# Patient Record
Sex: MALE | Race: WHITE | NOT HISPANIC OR LATINO | Employment: UNEMPLOYED | URBAN - METROPOLITAN AREA
[De-identification: names, ages, dates, MRNs, and addresses within clinical notes are randomized per-mention and may not be internally consistent; named-entity substitution may affect disease eponyms.]

---

## 2019-01-10 ENCOUNTER — HISTORICAL (OUTPATIENT)
Dept: RADIOLOGY | Facility: HOSPITAL | Age: 59
End: 2019-01-10

## 2019-01-10 LAB — POC CREATININE: 1.2 MG/DL (ref 0.6–1.3)

## 2021-04-14 ENCOUNTER — HISTORICAL (OUTPATIENT)
Dept: ADMINISTRATIVE | Facility: HOSPITAL | Age: 61
End: 2021-04-14

## 2021-04-14 LAB
ABS NEUT (OLG): 4.95 X10(3)/MCL (ref 2.1–9.2)
ALBUMIN SERPL-MCNC: 4 GM/DL (ref 3.5–5)
ALBUMIN/GLOB SERPL: 1.1 RATIO (ref 1.1–2)
ALP SERPL-CCNC: 146 UNIT/L (ref 40–150)
ALT SERPL-CCNC: 24 UNIT/L (ref 0–55)
AMPHET UR QL SCN: NEGATIVE
AST SERPL-CCNC: 19 UNIT/L (ref 5–34)
BARBITURATE SCN PRESENT UR: NEGATIVE
BASOPHILS # BLD AUTO: 0 X10(3)/MCL (ref 0–0.2)
BASOPHILS NFR BLD AUTO: 0 %
BENZODIAZ UR QL SCN: NEGATIVE
BILIRUB SERPL-MCNC: 0.3 MG/DL
BILIRUBIN DIRECT+TOT PNL SERPL-MCNC: 0.1 MG/DL (ref 0–0.5)
BILIRUBIN DIRECT+TOT PNL SERPL-MCNC: 0.2 MG/DL (ref 0–0.8)
BUN SERPL-MCNC: 10.8 MG/DL (ref 8.4–25.7)
CALCIUM SERPL-MCNC: 9.5 MG/DL (ref 8.4–10.2)
CANNABINOIDS UR QL SCN: NEGATIVE
CHLORIDE SERPL-SCNC: 100 MMOL/L (ref 98–107)
CHOLEST SERPL-MCNC: 208 MG/DL
CHOLEST/HDLC SERPL: 5 {RATIO} (ref 0–5)
CO2 SERPL-SCNC: 27 MMOL/L (ref 22–29)
COCAINE UR QL SCN: NEGATIVE
CREAT SERPL-MCNC: 0.97 MG/DL (ref 0.73–1.18)
EOSINOPHIL # BLD AUTO: 0.2 X10(3)/MCL (ref 0–0.9)
EOSINOPHIL NFR BLD AUTO: 2 %
ERYTHROCYTE [DISTWIDTH] IN BLOOD BY AUTOMATED COUNT: 13.1 % (ref 11.5–17)
EST. AVERAGE GLUCOSE BLD GHB EST-MCNC: 122.6 MG/DL
GLOBULIN SER-MCNC: 3.7 GM/DL (ref 2.4–3.5)
GLUCOSE SERPL-MCNC: 116 MG/DL (ref 74–100)
HBA1C MFR BLD: 5.9 %
HCT VFR BLD AUTO: 51.4 % (ref 42–52)
HDLC SERPL-MCNC: 42 MG/DL (ref 35–60)
HGB BLD-MCNC: 16.3 GM/DL (ref 14–18)
IMM GRANULOCYTES # BLD AUTO: 0.18 % (ref 0–0.02)
IMM GRANULOCYTES NFR BLD AUTO: 2.1 % (ref 0–0.43)
LDLC SERPL CALC-MCNC: 114 MG/DL (ref 50–140)
LYMPHOCYTES # BLD AUTO: 2.5 X10(3)/MCL (ref 0.6–4.6)
LYMPHOCYTES NFR BLD AUTO: 29 %
MCH RBC QN AUTO: 31.1 PG (ref 27–31)
MCHC RBC AUTO-ENTMCNC: 31.7 GM/DL (ref 33–36)
MCV RBC AUTO: 98.1 FL (ref 80–94)
MONOCYTES # BLD AUTO: 0.8 X10(3)/MCL (ref 0.1–1.3)
MONOCYTES NFR BLD AUTO: 9 %
NEUTROPHILS # BLD AUTO: 4.95 X10(3)/MCL (ref 1.4–7.9)
NEUTROPHILS NFR BLD AUTO: 57 %
OPIATES UR QL SCN: NEGATIVE
PCP UR QL: NEGATIVE
PH UR STRIP.AUTO: 7 [PH] (ref 5–7.5)
PLATELET # BLD AUTO: 174 X10(3)/MCL (ref 130–400)
PMV BLD AUTO: 11.3 FL (ref 9.4–12.4)
POTASSIUM SERPL-SCNC: 3.6 MMOL/L (ref 3.5–5.1)
PROT SERPL-MCNC: 7.7 GM/DL (ref 6.4–8.3)
RBC # BLD AUTO: 5.24 X10(6)/MCL (ref 4.7–6.1)
SODIUM SERPL-SCNC: 142 MMOL/L (ref 136–145)
SP GR FLD REFRACTOMETRY: 1 (ref 1–1)
TRIGL SERPL-MCNC: 262 MG/DL (ref 34–140)
TSH SERPL-ACNC: 2.44 UIU/ML (ref 0.35–4.94)
VALPROATE SERPL-MCNC: 69.6 UG/ML (ref 50–100)
VLDLC SERPL CALC-MCNC: 52 MG/DL
WBC # SPEC AUTO: 8.7 X10(3)/MCL (ref 4.5–11.5)

## 2021-04-19 ENCOUNTER — HISTORICAL (OUTPATIENT)
Dept: ADMINISTRATIVE | Facility: HOSPITAL | Age: 61
End: 2021-04-19

## 2021-04-19 LAB
ABS NEUT (OLG): 6.45 X10(3)/MCL (ref 2.1–9.2)
BASOPHILS # BLD AUTO: 0 X10(3)/MCL (ref 0–0.2)
BASOPHILS NFR BLD AUTO: 0 %
EOSINOPHIL # BLD AUTO: 0.1 X10(3)/MCL (ref 0–0.9)
EOSINOPHIL NFR BLD AUTO: 1 %
ERYTHROCYTE [DISTWIDTH] IN BLOOD BY AUTOMATED COUNT: 13.1 % (ref 11.5–17)
HCT VFR BLD AUTO: 50.4 % (ref 42–52)
HGB BLD-MCNC: 16 GM/DL (ref 14–18)
IMM GRANULOCYTES # BLD AUTO: 0.1 % (ref 0–0.02)
IMM GRANULOCYTES NFR BLD AUTO: 1 % (ref 0–0.43)
LYMPHOCYTES # BLD AUTO: 2.1 X10(3)/MCL (ref 0.6–4.6)
LYMPHOCYTES NFR BLD AUTO: 21 %
MCH RBC QN AUTO: 30.8 PG (ref 27–31)
MCHC RBC AUTO-ENTMCNC: 31.7 GM/DL (ref 33–36)
MCV RBC AUTO: 97.1 FL (ref 80–94)
MONOCYTES # BLD AUTO: 1 X10(3)/MCL (ref 0.1–1.3)
MONOCYTES NFR BLD AUTO: 11 %
NEUTROPHILS # BLD AUTO: 6.45 X10(3)/MCL (ref 1.4–7.9)
NEUTROPHILS NFR BLD AUTO: 66 %
PLATELET # BLD AUTO: 191 X10(3)/MCL (ref 130–400)
PMV BLD AUTO: 11.4 FL (ref 9.4–12.4)
RBC # BLD AUTO: 5.19 X10(6)/MCL (ref 4.7–6.1)
VALPROATE SERPL-MCNC: 52 UG/ML (ref 50–100)
WBC # SPEC AUTO: 9.8 X10(3)/MCL (ref 4.5–11.5)

## 2021-04-26 ENCOUNTER — HISTORICAL (OUTPATIENT)
Dept: ADMINISTRATIVE | Facility: HOSPITAL | Age: 61
End: 2021-04-26

## 2021-04-26 LAB
ABS NEUT (OLG): 4.52 X10(3)/MCL (ref 2.1–9.2)
BASOPHILS # BLD AUTO: 0 X10(3)/MCL (ref 0–0.2)
BASOPHILS NFR BLD AUTO: 0 %
EOSINOPHIL # BLD AUTO: 0.1 X10(3)/MCL (ref 0–0.9)
EOSINOPHIL NFR BLD AUTO: 1 %
ERYTHROCYTE [DISTWIDTH] IN BLOOD BY AUTOMATED COUNT: 12.9 % (ref 11.5–17)
HCT VFR BLD AUTO: 49.1 % (ref 42–52)
HGB BLD-MCNC: 15.9 GM/DL (ref 14–18)
IMM GRANULOCYTES # BLD AUTO: 0.11 % (ref 0–0.02)
IMM GRANULOCYTES NFR BLD AUTO: 1.5 % (ref 0–0.43)
LYMPHOCYTES # BLD AUTO: 1.9 X10(3)/MCL (ref 0.6–4.6)
LYMPHOCYTES NFR BLD AUTO: 26 %
MCH RBC QN AUTO: 31 PG (ref 27–31)
MCHC RBC AUTO-ENTMCNC: 32.4 GM/DL (ref 33–36)
MCV RBC AUTO: 95.7 FL (ref 80–94)
MONOCYTES # BLD AUTO: 0.7 X10(3)/MCL (ref 0.1–1.3)
MONOCYTES NFR BLD AUTO: 10 %
NEUTROPHILS # BLD AUTO: 4.52 X10(3)/MCL (ref 1.4–7.9)
NEUTROPHILS NFR BLD AUTO: 61 %
PLATELET # BLD AUTO: 167 X10(3)/MCL (ref 130–400)
PMV BLD AUTO: 12.1 FL (ref 9.4–12.4)
RBC # BLD AUTO: 5.13 X10(6)/MCL (ref 4.7–6.1)
VALPROATE SERPL-MCNC: 63.2 UG/ML (ref 50–100)
WBC # SPEC AUTO: 7.4 X10(3)/MCL (ref 4.5–11.5)

## 2022-04-11 ENCOUNTER — HISTORICAL (OUTPATIENT)
Dept: ADMINISTRATIVE | Facility: HOSPITAL | Age: 62
End: 2022-04-11

## 2022-04-29 VITALS
OXYGEN SATURATION: 88 % | WEIGHT: 237.63 LBS | BODY MASS INDEX: 32.19 KG/M2 | HEIGHT: 72 IN | SYSTOLIC BLOOD PRESSURE: 126 MMHG | DIASTOLIC BLOOD PRESSURE: 82 MMHG

## 2022-11-21 ENCOUNTER — HOSPITAL ENCOUNTER (OUTPATIENT)
Facility: HOSPITAL | Age: 62
Discharge: LEFT BEFORE DISCHARGE | End: 2022-11-22
Attending: STUDENT IN AN ORGANIZED HEALTH CARE EDUCATION/TRAINING PROGRAM | Admitting: INTERNAL MEDICINE
Payer: MEDICARE

## 2022-11-21 DIAGNOSIS — I63.9 CEREBROVASCULAR ACCIDENT (CVA), UNSPECIFIED MECHANISM: ICD-10-CM

## 2022-11-21 DIAGNOSIS — J10.1 INFLUENZA A: ICD-10-CM

## 2022-11-21 DIAGNOSIS — I95.9 HYPOTENSION, UNSPECIFIED HYPOTENSION TYPE: Primary | ICD-10-CM

## 2022-11-21 DIAGNOSIS — R07.9 CHEST PAIN: ICD-10-CM

## 2022-11-21 DIAGNOSIS — I63.9 STROKE: ICD-10-CM

## 2022-11-21 DIAGNOSIS — R41.82 ALTERED MENTAL STATUS, UNSPECIFIED ALTERED MENTAL STATUS TYPE: ICD-10-CM

## 2022-11-21 DIAGNOSIS — N17.9 AKI (ACUTE KIDNEY INJURY): ICD-10-CM

## 2022-11-21 DIAGNOSIS — R53.1 WEAKNESS: ICD-10-CM

## 2022-11-21 LAB
ALBUMIN SERPL-MCNC: 3.5 GM/DL (ref 3.4–4.8)
ALBUMIN/GLOB SERPL: 0.8 RATIO (ref 1.1–2)
ALP SERPL-CCNC: 137 UNIT/L (ref 40–150)
ALT SERPL-CCNC: 34 UNIT/L (ref 0–55)
AMPHET UR QL SCN: NEGATIVE
APPEARANCE UR: ABNORMAL
AST SERPL-CCNC: 44 UNIT/L (ref 5–34)
BACTERIA #/AREA URNS AUTO: ABNORMAL /HPF
BARBITURATE SCN PRESENT UR: NEGATIVE
BASOPHILS # BLD AUTO: 0.03 X10(3)/MCL (ref 0–0.2)
BASOPHILS NFR BLD AUTO: 0.2 %
BENZODIAZ UR QL SCN: NEGATIVE
BILIRUB UR QL STRIP.AUTO: NEGATIVE MG/DL
BILIRUBIN DIRECT+TOT PNL SERPL-MCNC: 0.6 MG/DL
BNP BLD-MCNC: <10 PG/ML
BUN SERPL-MCNC: 52.1 MG/DL (ref 8.4–25.7)
CALCIUM SERPL-MCNC: 10.4 MG/DL (ref 8.8–10)
CANNABINOIDS UR QL SCN: NEGATIVE
CHLORIDE SERPL-SCNC: 93 MMOL/L (ref 98–107)
CHOLEST SERPL-MCNC: 216 MG/DL
CHOLEST/HDLC SERPL: 7 {RATIO} (ref 0–5)
CO2 SERPL-SCNC: 28 MMOL/L (ref 23–31)
COCAINE UR QL SCN: NEGATIVE
COLOR UR AUTO: YELLOW
CREAT SERPL-MCNC: 3.21 MG/DL (ref 0.73–1.18)
CREAT UR-MCNC: 247.4 MG/DL (ref 63–166)
EOSINOPHIL # BLD AUTO: 0 X10(3)/MCL (ref 0–0.9)
EOSINOPHIL NFR BLD AUTO: 0 %
ERYTHROCYTE [DISTWIDTH] IN BLOOD BY AUTOMATED COUNT: 13.2 % (ref 11.5–17)
EST. AVERAGE GLUCOSE BLD GHB EST-MCNC: 131.2 MG/DL
FENTANYL UR QL SCN: POSITIVE
FERRITIN SERPL-MCNC: 930.98 NG/ML (ref 21.81–274.66)
FLUAV AG UPPER RESP QL IA.RAPID: DETECTED
FLUBV AG UPPER RESP QL IA.RAPID: NOT DETECTED
FOLATE SERPL-MCNC: 31 NG/ML (ref 7–31.4)
GFR SERPLBLD CREATININE-BSD FMLA CKD-EPI: 21 MLS/MIN/1.73/M2
GLOBULIN SER-MCNC: 4.2 GM/DL (ref 2.4–3.5)
GLUCOSE SERPL-MCNC: 146 MG/DL (ref 82–115)
GLUCOSE UR QL STRIP.AUTO: NORMAL MG/DL
HBA1C MFR BLD: 6.2 %
HCT VFR BLD AUTO: 42 % (ref 42–52)
HDLC SERPL-MCNC: 30 MG/DL (ref 35–60)
HGB BLD-MCNC: 13.7 GM/DL (ref 14–18)
HIV 1+2 AB+HIV1 P24 AG SERPL QL IA: NONREACTIVE
HYALINE CASTS #/AREA URNS LPF: ABNORMAL /LPF
IMM GRANULOCYTES # BLD AUTO: 0.2 X10(3)/MCL (ref 0–0.04)
IMM GRANULOCYTES NFR BLD AUTO: 1.2 %
IRON SATN MFR SERPL: 10 % (ref 20–50)
IRON SERPL-MCNC: 25 UG/DL (ref 65–175)
KETONES UR QL STRIP.AUTO: NEGATIVE MG/DL
LACTATE SERPL-SCNC: 1.1 MMOL/L (ref 0.5–2.2)
LACTATE SERPL-SCNC: 2 MMOL/L (ref 0.5–2.2)
LDLC SERPL CALC-MCNC: 136 MG/DL (ref 50–140)
LEUKOCYTE ESTERASE UR QL STRIP.AUTO: NEGATIVE UNIT/L
LYMPHOCYTES # BLD AUTO: 1.09 X10(3)/MCL (ref 0.6–4.6)
LYMPHOCYTES NFR BLD AUTO: 6.4 %
MCH RBC QN AUTO: 30.2 PG (ref 27–31)
MCHC RBC AUTO-ENTMCNC: 32.6 MG/DL (ref 33–36)
MCV RBC AUTO: 92.5 FL (ref 80–94)
MDMA UR QL SCN: NEGATIVE
MONOCYTES # BLD AUTO: 1.79 X10(3)/MCL (ref 0.1–1.3)
MONOCYTES NFR BLD AUTO: 10.4 %
MUCOUS THREADS URNS QL MICRO: ABNORMAL /LPF
NEUTROPHILS # BLD AUTO: 14 X10(3)/MCL (ref 2.1–9.2)
NEUTROPHILS NFR BLD AUTO: 81.8 %
NITRITE UR QL STRIP.AUTO: NEGATIVE
NRBC BLD AUTO-RTO: 0 %
OPIATES UR QL SCN: NEGATIVE
PCP UR QL: NEGATIVE
PH UR STRIP.AUTO: 6 [PH]
PH UR: 6 [PH] (ref 3–11)
PLATELET # BLD AUTO: 162 X10(3)/MCL (ref 130–400)
PMV BLD AUTO: 12.2 FL (ref 7.4–10.4)
POCT GLUCOSE: 107 MG/DL (ref 70–110)
POTASSIUM SERPL-SCNC: 3.5 MMOL/L (ref 3.5–5.1)
PROT SERPL-MCNC: 7.7 GM/DL (ref 5.8–7.6)
PROT UR QL STRIP.AUTO: ABNORMAL MG/DL
PROT UR STRIP-MCNC: 71 MG/DL
RBC # BLD AUTO: 4.54 X10(6)/MCL (ref 4.7–6.1)
RBC #/AREA URNS AUTO: ABNORMAL /HPF
RBC UR QL AUTO: ABNORMAL UNIT/L
RSV A 5' UTR RNA NPH QL NAA+PROBE: NOT DETECTED
SARS-COV-2 RNA RESP QL NAA+PROBE: NOT DETECTED
SODIUM SERPL-SCNC: 136 MMOL/L (ref 136–145)
SP GR UR STRIP.AUTO: 1.02
SPECIFIC GRAVITY, URINE AUTO (.000) (OHS): 1.02 (ref 1–1.03)
SQUAMOUS #/AREA URNS LPF: ABNORMAL /HPF
TIBC SERPL-MCNC: 216 UG/DL (ref 69–240)
TIBC SERPL-MCNC: 241 UG/DL (ref 250–450)
TRANSFERRIN SERPL-MCNC: 213 MG/DL (ref 163–344)
TRIGL SERPL-MCNC: 252 MG/DL (ref 34–140)
TROPONIN I SERPL-MCNC: 0.01 NG/ML (ref 0–0.04)
TSH SERPL-ACNC: 4.13 UIU/ML (ref 0.35–4.94)
URINE PROTEIN/CREATININE RATIO (OHS): 0.3
UROBILINOGEN UR STRIP-ACNC: NORMAL MG/DL
VIT B12 SERPL-MCNC: 450 PG/ML (ref 213–816)
VLDLC SERPL CALC-MCNC: 50 MG/DL
WBC # SPEC AUTO: 17.1 X10(3)/MCL (ref 4.5–11.5)
WBC #/AREA URNS AUTO: ABNORMAL /HPF

## 2022-11-21 PROCEDURE — 25000003 PHARM REV CODE 250: Performed by: STUDENT IN AN ORGANIZED HEALTH CARE EDUCATION/TRAINING PROGRAM

## 2022-11-21 PROCEDURE — 83605 ASSAY OF LACTIC ACID: CPT | Performed by: STUDENT IN AN ORGANIZED HEALTH CARE EDUCATION/TRAINING PROGRAM

## 2022-11-21 PROCEDURE — 25000003 PHARM REV CODE 250: Performed by: INTERNAL MEDICINE

## 2022-11-21 PROCEDURE — 0241U COVID/RSV/FLU A&B PCR: CPT | Performed by: STUDENT IN AN ORGANIZED HEALTH CARE EDUCATION/TRAINING PROGRAM

## 2022-11-21 PROCEDURE — 96361 HYDRATE IV INFUSION ADD-ON: CPT

## 2022-11-21 PROCEDURE — 96372 THER/PROPH/DIAG INJ SC/IM: CPT | Performed by: STUDENT IN AN ORGANIZED HEALTH CARE EDUCATION/TRAINING PROGRAM

## 2022-11-21 PROCEDURE — 82607 VITAMIN B-12: CPT | Performed by: STUDENT IN AN ORGANIZED HEALTH CARE EDUCATION/TRAINING PROGRAM

## 2022-11-21 PROCEDURE — 63600175 PHARM REV CODE 636 W HCPCS: Performed by: STUDENT IN AN ORGANIZED HEALTH CARE EDUCATION/TRAINING PROGRAM

## 2022-11-21 PROCEDURE — 85025 COMPLETE CBC W/AUTO DIFF WBC: CPT | Performed by: STUDENT IN AN ORGANIZED HEALTH CARE EDUCATION/TRAINING PROGRAM

## 2022-11-21 PROCEDURE — 36415 COLL VENOUS BLD VENIPUNCTURE: CPT | Performed by: STUDENT IN AN ORGANIZED HEALTH CARE EDUCATION/TRAINING PROGRAM

## 2022-11-21 PROCEDURE — 82728 ASSAY OF FERRITIN: CPT | Performed by: STUDENT IN AN ORGANIZED HEALTH CARE EDUCATION/TRAINING PROGRAM

## 2022-11-21 PROCEDURE — 99285 EMERGENCY DEPT VISIT HI MDM: CPT | Mod: 25

## 2022-11-21 PROCEDURE — 94761 N-INVAS EAR/PLS OXIMETRY MLT: CPT

## 2022-11-21 PROCEDURE — 80307 DRUG TEST PRSMV CHEM ANLYZR: CPT | Performed by: STUDENT IN AN ORGANIZED HEALTH CARE EDUCATION/TRAINING PROGRAM

## 2022-11-21 PROCEDURE — 81001 URINALYSIS AUTO W/SCOPE: CPT | Mod: 59 | Performed by: STUDENT IN AN ORGANIZED HEALTH CARE EDUCATION/TRAINING PROGRAM

## 2022-11-21 PROCEDURE — G0378 HOSPITAL OBSERVATION PER HR: HCPCS

## 2022-11-21 PROCEDURE — 83540 ASSAY OF IRON: CPT | Performed by: STUDENT IN AN ORGANIZED HEALTH CARE EDUCATION/TRAINING PROGRAM

## 2022-11-21 PROCEDURE — 84153 ASSAY OF PSA TOTAL: CPT | Performed by: STUDENT IN AN ORGANIZED HEALTH CARE EDUCATION/TRAINING PROGRAM

## 2022-11-21 PROCEDURE — 80053 COMPREHEN METABOLIC PANEL: CPT | Performed by: STUDENT IN AN ORGANIZED HEALTH CARE EDUCATION/TRAINING PROGRAM

## 2022-11-21 PROCEDURE — 82570 ASSAY OF URINE CREATININE: CPT | Performed by: STUDENT IN AN ORGANIZED HEALTH CARE EDUCATION/TRAINING PROGRAM

## 2022-11-21 PROCEDURE — 96367 TX/PROPH/DG ADDL SEQ IV INF: CPT

## 2022-11-21 PROCEDURE — 87389 HIV-1 AG W/HIV-1&-2 AB AG IA: CPT | Performed by: STUDENT IN AN ORGANIZED HEALTH CARE EDUCATION/TRAINING PROGRAM

## 2022-11-21 PROCEDURE — 27000221 HC OXYGEN, UP TO 24 HOURS

## 2022-11-21 PROCEDURE — 84154 ASSAY OF PSA FREE: CPT | Mod: 59 | Performed by: STUDENT IN AN ORGANIZED HEALTH CARE EDUCATION/TRAINING PROGRAM

## 2022-11-21 PROCEDURE — 84100 ASSAY OF PHOSPHORUS: CPT | Performed by: STUDENT IN AN ORGANIZED HEALTH CARE EDUCATION/TRAINING PROGRAM

## 2022-11-21 PROCEDURE — 83880 ASSAY OF NATRIURETIC PEPTIDE: CPT | Performed by: STUDENT IN AN ORGANIZED HEALTH CARE EDUCATION/TRAINING PROGRAM

## 2022-11-21 PROCEDURE — 93005 ELECTROCARDIOGRAM TRACING: CPT

## 2022-11-21 PROCEDURE — 82746 ASSAY OF FOLIC ACID SERUM: CPT | Performed by: STUDENT IN AN ORGANIZED HEALTH CARE EDUCATION/TRAINING PROGRAM

## 2022-11-21 PROCEDURE — 84484 ASSAY OF TROPONIN QUANT: CPT | Performed by: STUDENT IN AN ORGANIZED HEALTH CARE EDUCATION/TRAINING PROGRAM

## 2022-11-21 PROCEDURE — 83605 ASSAY OF LACTIC ACID: CPT | Mod: 91 | Performed by: STUDENT IN AN ORGANIZED HEALTH CARE EDUCATION/TRAINING PROGRAM

## 2022-11-21 PROCEDURE — 84443 ASSAY THYROID STIM HORMONE: CPT | Performed by: STUDENT IN AN ORGANIZED HEALTH CARE EDUCATION/TRAINING PROGRAM

## 2022-11-21 PROCEDURE — 83735 ASSAY OF MAGNESIUM: CPT | Performed by: STUDENT IN AN ORGANIZED HEALTH CARE EDUCATION/TRAINING PROGRAM

## 2022-11-21 PROCEDURE — 80061 LIPID PANEL: CPT | Performed by: STUDENT IN AN ORGANIZED HEALTH CARE EDUCATION/TRAINING PROGRAM

## 2022-11-21 PROCEDURE — 83036 HEMOGLOBIN GLYCOSYLATED A1C: CPT | Performed by: STUDENT IN AN ORGANIZED HEALTH CARE EDUCATION/TRAINING PROGRAM

## 2022-11-21 PROCEDURE — 96365 THER/PROPH/DIAG IV INF INIT: CPT

## 2022-11-21 PROCEDURE — 87040 BLOOD CULTURE FOR BACTERIA: CPT | Performed by: STUDENT IN AN ORGANIZED HEALTH CARE EDUCATION/TRAINING PROGRAM

## 2022-11-21 RX ORDER — CITALOPRAM 20 MG/1
20 TABLET, FILM COATED ORAL EVERY MORNING
COMMUNITY
Start: 2022-11-07

## 2022-11-21 RX ORDER — LINAGLIPTIN 5 MG/1
5 TABLET, FILM COATED ORAL EVERY MORNING
COMMUNITY
Start: 2022-11-07

## 2022-11-21 RX ORDER — GLUCAGON 1 MG
1 KIT INJECTION
Status: DISCONTINUED | OUTPATIENT
Start: 2022-11-21 | End: 2022-11-22 | Stop reason: HOSPADM

## 2022-11-21 RX ORDER — GEMFIBROZIL 600 MG/1
600 TABLET, FILM COATED ORAL EVERY MORNING
COMMUNITY
Start: 2022-11-07

## 2022-11-21 RX ORDER — IBUPROFEN 200 MG
24 TABLET ORAL
Status: DISCONTINUED | OUTPATIENT
Start: 2022-11-21 | End: 2022-11-22 | Stop reason: HOSPADM

## 2022-11-21 RX ORDER — NALOXONE HCL 0.4 MG/ML
0.02 VIAL (ML) INJECTION
Status: DISCONTINUED | OUTPATIENT
Start: 2022-11-21 | End: 2022-11-22 | Stop reason: HOSPADM

## 2022-11-21 RX ORDER — INSULIN ASPART 100 [IU]/ML
0-5 INJECTION, SOLUTION INTRAVENOUS; SUBCUTANEOUS
Status: DISCONTINUED | OUTPATIENT
Start: 2022-11-21 | End: 2022-11-22 | Stop reason: HOSPADM

## 2022-11-21 RX ORDER — CLOZAPINE 100 MG/1
100 TABLET ORAL NIGHTLY
Status: DISCONTINUED | OUTPATIENT
Start: 2022-11-21 | End: 2022-11-22 | Stop reason: HOSPADM

## 2022-11-21 RX ORDER — CLOZAPINE 100 MG/1
TABLET ORAL
COMMUNITY
Start: 2022-11-04

## 2022-11-21 RX ORDER — CLONAZEPAM 1 MG/1
1 TABLET ORAL NIGHTLY
COMMUNITY
Start: 2022-11-07

## 2022-11-21 RX ORDER — PIOGLITAZONEHYDROCHLORIDE 45 MG/1
45 TABLET ORAL EVERY MORNING
COMMUNITY
Start: 2022-11-07

## 2022-11-21 RX ORDER — B-COMPLEX WITH VITAMIN C
1 TABLET ORAL EVERY MORNING
COMMUNITY
Start: 2022-11-07

## 2022-11-21 RX ORDER — ATENOLOL 50 MG/1
50 TABLET ORAL EVERY MORNING
COMMUNITY
Start: 2022-11-07

## 2022-11-21 RX ORDER — OMEPRAZOLE 40 MG/1
40 CAPSULE, DELAYED RELEASE ORAL EVERY MORNING
COMMUNITY
Start: 2022-11-07

## 2022-11-21 RX ORDER — SODIUM CHLORIDE 9 MG/ML
INJECTION, SOLUTION INTRAVENOUS CONTINUOUS
Status: DISCONTINUED | OUTPATIENT
Start: 2022-11-21 | End: 2022-11-22 | Stop reason: HOSPADM

## 2022-11-21 RX ORDER — RAMIPRIL 10 MG/1
10 CAPSULE ORAL EVERY MORNING
COMMUNITY
Start: 2022-11-07

## 2022-11-21 RX ORDER — CITALOPRAM 10 MG/1
20 TABLET ORAL EVERY MORNING
Status: DISCONTINUED | OUTPATIENT
Start: 2022-11-22 | End: 2022-11-22 | Stop reason: HOSPADM

## 2022-11-21 RX ORDER — DIVALPROEX SODIUM 500 MG/1
1500 TABLET, FILM COATED, EXTENDED RELEASE ORAL NIGHTLY
COMMUNITY
Start: 2022-11-07

## 2022-11-21 RX ORDER — OSELTAMIVIR PHOSPHATE 30 MG/1
30 CAPSULE ORAL DAILY
Status: DISCONTINUED | OUTPATIENT
Start: 2022-11-21 | End: 2022-11-22 | Stop reason: HOSPADM

## 2022-11-21 RX ORDER — OSELTAMIVIR PHOSPHATE 75 MG/1
75 CAPSULE ORAL 2 TIMES DAILY
Status: DISCONTINUED | OUTPATIENT
Start: 2022-11-21 | End: 2022-11-21

## 2022-11-21 RX ORDER — EZETIMIBE 10 MG/1
10 TABLET ORAL DAILY
COMMUNITY
Start: 2022-11-07

## 2022-11-21 RX ORDER — IBUPROFEN 200 MG
16 TABLET ORAL
Status: DISCONTINUED | OUTPATIENT
Start: 2022-11-21 | End: 2022-11-22 | Stop reason: HOSPADM

## 2022-11-21 RX ORDER — HEPARIN SODIUM 5000 [USP'U]/ML
5000 INJECTION, SOLUTION INTRAVENOUS; SUBCUTANEOUS EVERY 8 HOURS
Status: DISCONTINUED | OUTPATIENT
Start: 2022-11-21 | End: 2022-11-22 | Stop reason: HOSPADM

## 2022-11-21 RX ORDER — ATORVASTATIN CALCIUM 40 MG/1
80 TABLET, FILM COATED ORAL DAILY
Status: DISCONTINUED | OUTPATIENT
Start: 2022-11-22 | End: 2022-11-22 | Stop reason: HOSPADM

## 2022-11-21 RX ORDER — SODIUM CHLORIDE 0.9 % (FLUSH) 0.9 %
10 SYRINGE (ML) INJECTION EVERY 8 HOURS PRN
Status: DISCONTINUED | OUTPATIENT
Start: 2022-11-21 | End: 2022-11-22 | Stop reason: HOSPADM

## 2022-11-21 RX ORDER — TRAZODONE HYDROCHLORIDE 100 MG/1
200 TABLET ORAL NIGHTLY
COMMUNITY
Start: 2022-11-07

## 2022-11-21 RX ORDER — ASPIRIN 325 MG
325 TABLET ORAL DAILY
Status: DISCONTINUED | OUTPATIENT
Start: 2022-11-22 | End: 2022-11-22 | Stop reason: HOSPADM

## 2022-11-21 RX ORDER — POTASSIUM CHLORIDE 20 MEQ/1
20 TABLET, EXTENDED RELEASE ORAL 2 TIMES DAILY
COMMUNITY
Start: 2022-11-07

## 2022-11-21 RX ORDER — GEMFIBROZIL 600 MG/1
600 TABLET, FILM COATED ORAL EVERY MORNING
Status: DISCONTINUED | OUTPATIENT
Start: 2022-11-22 | End: 2022-11-22 | Stop reason: HOSPADM

## 2022-11-21 RX ORDER — DIVALPROEX SODIUM 500 MG/1
1500 TABLET, FILM COATED, EXTENDED RELEASE ORAL NIGHTLY
Status: DISCONTINUED | OUTPATIENT
Start: 2022-11-21 | End: 2022-11-22 | Stop reason: HOSPADM

## 2022-11-21 RX ADMIN — PIPERACILLIN AND TAZOBACTAM 4.5 G: 4; .5 INJECTION, POWDER, LYOPHILIZED, FOR SOLUTION INTRAVENOUS; PARENTERAL at 02:11

## 2022-11-21 RX ADMIN — SODIUM CHLORIDE, POTASSIUM CHLORIDE, SODIUM LACTATE AND CALCIUM CHLORIDE 2769 ML: 600; 310; 30; 20 INJECTION, SOLUTION INTRAVENOUS at 01:11

## 2022-11-21 RX ADMIN — SODIUM CHLORIDE: 9 INJECTION, SOLUTION INTRAVENOUS at 07:11

## 2022-11-21 RX ADMIN — VANCOMYCIN HYDROCHLORIDE 1000 MG: 500 INJECTION, POWDER, LYOPHILIZED, FOR SOLUTION INTRAVENOUS at 02:11

## 2022-11-21 RX ADMIN — OSELTAMIVIR PHOSPHATE 30 MG: 30 CAPSULE ORAL at 09:11

## 2022-11-21 RX ADMIN — SODIUM CHLORIDE 1 G: 900 INJECTION INTRAVENOUS at 08:11

## 2022-11-21 RX ADMIN — CLOZAPINE 100 MG: 100 TABLET ORAL at 09:11

## 2022-11-21 RX ADMIN — HEPARIN SODIUM 5000 UNITS: 5000 INJECTION, SOLUTION INTRAVENOUS; SUBCUTANEOUS at 09:11

## 2022-11-21 RX ADMIN — DIVALPROEX SODIUM 1500 MG: 500 TABLET, FILM COATED, EXTENDED RELEASE ORAL at 09:11

## 2022-11-21 NOTE — H&P
[facility] Medicine History and Physical     Attending Physician: Mario Alberto Farrar MD    Subjective:      Brief HPI:  Hernando Chung is a 62 y.o. male who  has a past medical history of Schizophrenia, unspecified.  He presented to [facility] on 11/21/2022  with a primary complaint of Hypotension (C/o cough, congestion, chest pain, SOB, generalized weakness, no appetite x2 weeks. Hypotensive and low o2 sat in triage. )  .  Patient presents to the emergency department for 3 days of not eating or drinking.  And increased cough and congestion during the same time.  Information is obtained from patient's sister, patient is schizophrenic and currently taking medication for it.  However on my interview patient understands that he is in a hospital, that he knows the year, and he knows that he is in [place].  Sister says that he presents with generalized weakness, and some chest pain secondary to a cough.  Patient goes to [facility], patient's sister is unaware of what medications he is currently taking however she knows that he is on quite a bit of medication.  Patient says that he has some dizziness, and some chest pain, and some congestion.  Patient denies any issues with urination however he had just urinated on himself a few minutes before had walked into the room.  Patient has been having some slurring of his words per patient's sister that had started with all this happened proximally 3 days ago.  And also been having some dizziness and unsteadiness when standing.    Past Medical History:   Diagnosis Date    Schizophrenia, unspecified        History reviewed. No pertinent surgical history.    Review of patient's allergies indicates:  No Known Allergies    No current outpatient medications     Family History    None         Tobacco Use    Smoking status: Not on file    Smokeless tobacco: Not on file   Substance and Sexual Activity    Alcohol use: Not on file    Drug use: Not on file    Sexual activity: Not on file         Review of Systems:  Unable to fully assess 2/2 to patients mental status     Objective:     Vital Signs:  Vital Signs (Most Recent):  Temp: 97.3 °F (36.3 °C) (11/21/22 1248)  Pulse: 84 (11/21/22 1632)  Resp: 20 (11/21/22 1516)  BP: 94/61 (11/21/22 1632)  SpO2: 98 % (11/21/22 1632) Vital Signs (24h Range):  Temp:  [97.3 °F (36.3 °C)] 97.3 °F (36.3 °C)  Pulse:  [75-84] 84  Resp:  [15-30] 20  SpO2:  [86 %-99 %] 98 %  BP: ()/(55-62) 94/61   Body mass index is 27.86 kg/m².     Intake/Output Summary (Last 24 hours) at 11/21/2022 1759  Last data filed at 11/21/2022 1555  Gross per 24 hour   Intake 3119 ml   Output --   Net 3119 ml       Physical Examination:  General:  Well developed, well nourished, no acute respiratory distress  Head: Normocephalic, atraumatic  Eyes: PERRL, EOMI, anicteric sclera. Xanthomas noticeable  Throat: No posterior pharyngeal erythema or exudate, no tonsillar exudate  Neck: supple, normal ROM, no JVD  CVS:  RRR, S1 and S2 normal, no murmurs, no added heart sounds, rubs, gallops, regular peripheral pulses, and no peripheral edema  Resp:  Lungs clear to auscultation bilaterally, no wheezes, rales, or rhonci  GI:  Abdomen soft, non-tender, non-distended, normoactive bowel sounds  MSK:  Full range of motion, no obvious deformities   Skin:  No rashes, ulcers, erythema  Neuro:  Alert and oriented x2, CNII-XII grossly intact, patient has finger-nose-finger positive on left side and on the right side it is less.  Negative for dysdiadochokinesia.  Both proximal and distal muscles in the upper and lower extremities is 5/5.  When I stent patient up patient is unsteady, patient's blood pressure dropped by 15 systolic when standing from lying down.  Patient is unsteady when standing and Romberg positive.  Patient is unable to ambulate without falling.  Patient is unsteady on his feet.  Psych:  Appropriate mood,     Laboratory:    Recent Labs   Lab 11/21/22  1308   WBC 17.1*   HGB 13.7*   HCT 42.0       MCV 92.5   RDW 13.2     Recent Labs   Lab 11/21/22  1308   TROPONINI 0.011   BNP <10.0     Recent Labs   Lab 11/21/22  1308   TROPONINI 0.011   BNP <10.0     No results for input(s): CHOL, HDL, LDLCALC, TRIG, CHOLHDL in the last 168 hours. Recent Labs   Lab 11/21/22  1308      K 3.5   CHLORIDE 93*   CO2 28   BUN 52.1*   CREATININE 3.21*   CALCIUM 10.4*     Recent Labs   Lab 11/21/22  1308   ALBUMIN 3.5   BILITOT 0.6   AST 44*   ALKPHOS 137   ALT 34     No results for input(s): IRON, TIBC, FERRITIN, SATURATEDIRO, [id number], FOLATE in the last 168 hours.  Recent Labs   Lab 11/21/22  1308   TSH 4.1319          Microbiology Data:  Microbiology Results (last 7 days)       Procedure Component Value Units Date/Time    Blood culture x two cultures. Draw prior to antibiotics. [[id number]] Collected: 11/21/22 1308    Order Status: Sent Specimen: Blood from Hand, Right Updated: 11/21/22 1404    Blood culture x two cultures. Draw prior to antibiotics. [[id number]] Collected: 11/21/22 1320    Order Status: Sent Specimen: Blood from Hand, Left Updated: 11/21/22 1404             Other Results:    Radiology:  Imaging Results              US Retroperitoneal Limited (In process)                      CT Head Without Contrast (Final result)  Result time 11/21/22 16:18:48      Final result by Edelmira East MD (11/21/22 16:18:48)                   Impression:      Punctate area of hypoattenuation in the basal ganglia on the right consistent with ischemia of undetermined age    Hypoattenuated area in the cerebellum on the left side consistent with ischemia of indeterminate age    Old lacunar infarct in the basal ganglia on the left    This patient would benefit from an MRI for  correlation      Electronically signed by: Edelmira East  Date:    11/21/2022  Time:    16:18               Narrative:    EXAMINATION:  CT HEAD WITHOUT CONTRAST    CLINICAL HISTORY:  Mental status change, unknown  cause;    TECHNIQUE:  Multiple axial images were obtained from the base of the brain to the vertex without contrast administration.  Sagittal and coronal reconstructions were performed. .Automatic exposure control  (AEC) is utilized to reduce patient radiation exposure.    COMPARISON:  02/11/2012    FINDINGS:  There is no intracranial mass or lesion seen.  No hemorrhage is seen.  There is area of hypoattenuation in the cerebellum on the left side consistent with ischemia of undetermined age.  There is a punctate area of old lacunar infarct seen in the basal ganglia on the left.  There is a area of hypoattenuation in the basal ganglia on the right consistent with focal area ischemia of undetermined age..  The ventricles and basilar cisterns appear normal.  Brain parenchyma appears grossly unremarkable.    Posterior fossa appears normal.  The calvarium is intact.  The paranasal sinuses shows evidence of ethmoid sinusitis and mild mucosal thickening in the sphenoid and frontal sinus..                                       X-Ray Chest AP Portable (Final result)  Result time 11/21/22 13:29:59      Final result by Madonna Medina MD (11/21/22 13:29:59)                   Impression:      No acute abnormality of the chest.      Electronically signed by: Madonna Medina  Date:    11/21/2022  Time:    13:29               Narrative:    EXAMINATION:  XR CHEST AP PORTABLE    CLINICAL HISTORY:  Sepsis;    COMPARISON:  None    FINDINGS:  The heart is normal in size.  The lungs are clear without focal consolidation.  There is no pleural effusion or visible pneumothorax.                                      Current Medications:     Infusions:        Scheduled:   [START ON 11/22/2022] aspirin  325 mg Oral Daily    heparin (porcine)  5,000 Units Subcutaneous Q8H         PRN:   dextrose 10%    dextrose 10%    glucagon (human recombinant)    glucose    glucose    naloxone    sodium chloride 0.9%        Antibiotics and Day Number of  Therapy:  Antibiotics (From admission, onward)      None                 Assessment & Plan:   Stroke of indeterminate age:  -CT scan with findings above stroke of indeterminate age.  Patient's ABCD2 score is 4, will do aspirin 325 daily.  Atorvastatin 80.    -will do secondary workup to include carotid ultrasound, will hold off on CTA of the head as patient has a acute kidney injury.   -will get echocardiogram with bubble.  Patient may benefit from a event monitor.    -will get an MRI tomorrow     Acute kidney injury:  -will get a protein/creatinine ratio   -Elevated calcium likely secondary to dehydration, patient did not have any fluids or anything eat for the past 3 days.    -patient was given 30 cc/kg of IV fluid bolus.  Will give a maintenance rate of 100 of ns.  Patient had tachypnea, and elevated white blood count, however no nidus of infection noticeable.  Patient was started on empiric antibiotic coverage with vancomycin and Zosyn.    -will send a MRSA PCR, patient's urine has some WBCs however it is nitrite negative will empirically cover with Rocephin patient denies any symptoms    Schizophrenia:  Altered mental status:  -patient has been having schizophrenia since he was 18 years old patient is on quite a bit of medication and may have some aspect of polypharmacy.  Patient is on clozapine, clonazepam, Celexa, Depakote    Hypotension:   Likely secondary to polypharmacy patient is on atenolol, chlorthalidone, ramipril.  Will hold medications and get some orthostatics    Influenza:  -start renally dosed Tamiflu    Diabetes mellitus:   Patient was on Actos, Tradjenta,  Sliding scale insulin in the meantime    History of prostate cancer:   -patient had a robotic prostatectomy in 2019 there were no metastasis noted on PET scan.  Will get a PSA       CODE STATUS:  Full code  Access:  IV  Antibiotics:  Rocephin, patient was given vancomycin and Zosyn in the ED  Diet:  Diabetic  DVT Prophylaxis:  Heparin 5 K  GI  Prophylaxis:  none  Fluids:  Normal saline 100 cc/hour      Disposition:  Patient admitted for hypotension and altered mental status.  However patient is on quite a bit of medication for schizophrenia.  Patient is on clozapine.  Will continue psychiatry medications.  However with patient's hypotension will hold off on hypertension medications as may have some aspect of polypharmacy.  Will give IV fluid resuscitation along with holding of medications and will see orthostatics.  Patient also has a history of remote infarcts in the brain, will get a secondary workup along with an MRI.  Unable to get a CTA head of because of CKD.         Kirit Harrell MD  Internal Medicine Resident PGY-II

## 2022-11-22 VITALS
BODY MASS INDEX: 28.42 KG/M2 | HEIGHT: 71 IN | SYSTOLIC BLOOD PRESSURE: 141 MMHG | RESPIRATION RATE: 22 BRPM | WEIGHT: 203 LBS | OXYGEN SATURATION: 93 % | DIASTOLIC BLOOD PRESSURE: 84 MMHG | TEMPERATURE: 98 F | HEART RATE: 87 BPM

## 2022-11-22 LAB
ALBUMIN SERPL-MCNC: 2.7 GM/DL (ref 3.4–4.8)
ALBUMIN/GLOB SERPL: 0.8 RATIO (ref 1.1–2)
ALP SERPL-CCNC: 109 UNIT/L (ref 40–150)
ALT SERPL-CCNC: 31 UNIT/L (ref 0–55)
AST SERPL-CCNC: 43 UNIT/L (ref 5–34)
AV INDEX (PROSTH): 0.75
AV MEAN GRADIENT: 4 MMHG
AV PEAK GRADIENT: 6 MMHG
AV VELOCITY RATIO: 0.68
BASOPHILS # BLD AUTO: 0.02 X10(3)/MCL (ref 0–0.2)
BASOPHILS NFR BLD AUTO: 0.2 %
BILIRUBIN DIRECT+TOT PNL SERPL-MCNC: 0.3 MG/DL
BSA FOR ECHO PROCEDURE: 2.15 M2
BUN SERPL-MCNC: 35.8 MG/DL (ref 8.4–25.7)
CALCIUM SERPL-MCNC: 9.5 MG/DL (ref 8.8–10)
CHLORIDE SERPL-SCNC: 101 MMOL/L (ref 98–107)
CO2 SERPL-SCNC: 28 MMOL/L (ref 23–31)
CREAT SERPL-MCNC: 1.51 MG/DL (ref 0.73–1.18)
CRP SERPL-MCNC: 129.2 MG/L
CV ECHO LV RWT: 0.35 CM
DOP CALC AO PEAK VEL: 1.23 M/S
DOP CALC AO VTI: 22.7 CM
DOP CALC LVOT PEAK VEL: 0.84 M/S
DOP CALC MV VTI: 20.9 CM
DOP CALCLVOT PEAK VEL VTI: 17.1 CM
E WAVE DECELERATION TIME: 140.03 MSEC
E/A RATIO: 1.52
E/E' RATIO: 5.83 M/S
ECHO LV POSTERIOR WALL: 0.77 CM (ref 0.6–1.1)
EJECTION FRACTION: 55 %
EOSINOPHIL # BLD AUTO: 0.01 X10(3)/MCL (ref 0–0.9)
EOSINOPHIL NFR BLD AUTO: 0.1 %
ERYTHROCYTE [DISTWIDTH] IN BLOOD BY AUTOMATED COUNT: 13.1 % (ref 11.5–17)
ERYTHROCYTE [SEDIMENTATION RATE] IN BLOOD: 44 MM/HR (ref 0–15)
FRACTIONAL SHORTENING: 30 % (ref 28–44)
FREE/TOTAL PSA (OHS): NORMAL
GFR SERPLBLD CREATININE-BSD FMLA CKD-EPI: 52 MLS/MIN/1.73/M2
GLOBULIN SER-MCNC: 3.2 GM/DL (ref 2.4–3.5)
GLUCOSE SERPL-MCNC: 115 MG/DL (ref 82–115)
HCT VFR BLD AUTO: 36.1 % (ref 42–52)
HGB BLD-MCNC: 11.8 GM/DL (ref 14–18)
IMM GRANULOCYTES # BLD AUTO: 0.07 X10(3)/MCL (ref 0–0.04)
IMM GRANULOCYTES NFR BLD AUTO: 0.8 %
INTERVENTRICULAR SEPTUM: 0.99 CM (ref 0.6–1.1)
LEFT ATRIUM SIZE: 2.64 CM
LEFT INTERNAL DIMENSION IN SYSTOLE: 3.08 CM (ref 2.1–4)
LEFT VENTRICLE DIASTOLIC VOLUME INDEX: 41.23 ML/M2
LEFT VENTRICLE DIASTOLIC VOLUME: 87.4 ML
LEFT VENTRICLE MASS INDEX: 58 G/M2
LEFT VENTRICLE SYSTOLIC VOLUME INDEX: 17.6 ML/M2
LEFT VENTRICLE SYSTOLIC VOLUME: 37.34 ML
LEFT VENTRICULAR INTERNAL DIMENSION IN DIASTOLE: 4.39 CM (ref 3.5–6)
LEFT VENTRICULAR MASS: 123.74 G
LV LATERAL E/E' RATIO: 5.58 M/S
LV SEPTAL E/E' RATIO: 6.09 M/S
LVOT MG: 1.68 MMHG
LVOT MV: 0.62 CM/S
LYMPHOCYTES # BLD AUTO: 0.94 X10(3)/MCL (ref 0.6–4.6)
LYMPHOCYTES NFR BLD AUTO: 10.3 %
MAGNESIUM SERPL-MCNC: 2.3 MG/DL (ref 1.6–2.6)
MCH RBC QN AUTO: 29.9 PG (ref 27–31)
MCHC RBC AUTO-ENTMCNC: 32.7 MG/DL (ref 33–36)
MCV RBC AUTO: 91.6 FL (ref 80–94)
MONOCYTES # BLD AUTO: 0.8 X10(3)/MCL (ref 0.1–1.3)
MONOCYTES NFR BLD AUTO: 8.8 %
MRSA PCR SCRN (OHS): NOT DETECTED
MV MEAN GRADIENT: 1 MMHG
MV PEAK A VEL: 0.44 M/S
MV PEAK E VEL: 0.67 M/S
MV PEAK GRADIENT: 3 MMHG
MV STENOSIS PRESSURE HALF TIME: 40.61 MS
MV VALVE AREA P 1/2 METHOD: 5.42 CM2
NEUTROPHILS # BLD AUTO: 7.3 X10(3)/MCL (ref 2.1–9.2)
NEUTROPHILS NFR BLD AUTO: 79.8 %
NRBC BLD AUTO-RTO: 0 %
PHOSPHATE SERPL-MCNC: 2.8 MG/DL (ref 2.3–4.7)
PLATELET # BLD AUTO: 127 X10(3)/MCL (ref 130–400)
PMV BLD AUTO: 12.5 FL (ref 7.4–10.4)
POCT GLUCOSE: 102 MG/DL (ref 70–110)
POCT GLUCOSE: 130 MG/DL (ref 70–110)
POTASSIUM SERPL-SCNC: 2.9 MMOL/L (ref 3.5–5.1)
PROT SERPL-MCNC: 5.9 GM/DL (ref 5.8–7.6)
PSA FREE MFR SERPL: NORMAL %
PSA FREE SERPL-MCNC: <0.1 NG/ML
PSA SERPL-MCNC: <0.1 NG/ML
RA PRESSURE: 8 MMHG
RBC # BLD AUTO: 3.94 X10(6)/MCL (ref 4.7–6.1)
SODIUM SERPL-SCNC: 140 MMOL/L (ref 136–145)
TDI LATERAL: 0.12 M/S
TDI SEPTAL: 0.11 M/S
TDI: 0.12 M/S
WBC # SPEC AUTO: 9.1 X10(3)/MCL (ref 4.5–11.5)

## 2022-11-22 PROCEDURE — 36415 COLL VENOUS BLD VENIPUNCTURE: CPT | Performed by: STUDENT IN AN ORGANIZED HEALTH CARE EDUCATION/TRAINING PROGRAM

## 2022-11-22 PROCEDURE — 63600175 PHARM REV CODE 636 W HCPCS: Performed by: STUDENT IN AN ORGANIZED HEALTH CARE EDUCATION/TRAINING PROGRAM

## 2022-11-22 PROCEDURE — 85025 COMPLETE CBC W/AUTO DIFF WBC: CPT | Performed by: STUDENT IN AN ORGANIZED HEALTH CARE EDUCATION/TRAINING PROGRAM

## 2022-11-22 PROCEDURE — 86140 C-REACTIVE PROTEIN: CPT | Performed by: STUDENT IN AN ORGANIZED HEALTH CARE EDUCATION/TRAINING PROGRAM

## 2022-11-22 PROCEDURE — 96372 THER/PROPH/DIAG INJ SC/IM: CPT | Mod: 59 | Performed by: STUDENT IN AN ORGANIZED HEALTH CARE EDUCATION/TRAINING PROGRAM

## 2022-11-22 PROCEDURE — 96376 TX/PRO/DX INJ SAME DRUG ADON: CPT

## 2022-11-22 PROCEDURE — 96361 HYDRATE IV INFUSION ADD-ON: CPT

## 2022-11-22 PROCEDURE — G0378 HOSPITAL OBSERVATION PER HR: HCPCS

## 2022-11-22 PROCEDURE — 27000221 HC OXYGEN, UP TO 24 HOURS

## 2022-11-22 PROCEDURE — 96360 HYDRATION IV INFUSION INIT: CPT

## 2022-11-22 PROCEDURE — 96374 THER/PROPH/DIAG INJ IV PUSH: CPT | Mod: 59

## 2022-11-22 PROCEDURE — 92610 EVALUATE SWALLOWING FUNCTION: CPT

## 2022-11-22 PROCEDURE — 94761 N-INVAS EAR/PLS OXIMETRY MLT: CPT

## 2022-11-22 PROCEDURE — 80053 COMPREHEN METABOLIC PANEL: CPT | Performed by: STUDENT IN AN ORGANIZED HEALTH CARE EDUCATION/TRAINING PROGRAM

## 2022-11-22 PROCEDURE — 87641 MR-STAPH DNA AMP PROBE: CPT | Performed by: STUDENT IN AN ORGANIZED HEALTH CARE EDUCATION/TRAINING PROGRAM

## 2022-11-22 PROCEDURE — 25000003 PHARM REV CODE 250: Performed by: STUDENT IN AN ORGANIZED HEALTH CARE EDUCATION/TRAINING PROGRAM

## 2022-11-22 PROCEDURE — 25500020 PHARM REV CODE 255: Performed by: INTERNAL MEDICINE

## 2022-11-22 PROCEDURE — 85651 RBC SED RATE NONAUTOMATED: CPT | Performed by: STUDENT IN AN ORGANIZED HEALTH CARE EDUCATION/TRAINING PROGRAM

## 2022-11-22 RX ORDER — SODIUM CHLORIDE, SODIUM LACTATE, POTASSIUM CHLORIDE, CALCIUM CHLORIDE 600; 310; 30; 20 MG/100ML; MG/100ML; MG/100ML; MG/100ML
INJECTION, SOLUTION INTRAVENOUS CONTINUOUS
Status: DISCONTINUED | OUTPATIENT
Start: 2022-11-22 | End: 2022-11-22 | Stop reason: HOSPADM

## 2022-11-22 RX ORDER — POTASSIUM CHLORIDE 7.45 MG/ML
10 INJECTION INTRAVENOUS
Status: DISPENSED | OUTPATIENT
Start: 2022-11-22 | End: 2022-11-22

## 2022-11-22 RX ADMIN — CITALOPRAM HYDROBROMIDE 20 MG: 10 TABLET, FILM COATED ORAL at 09:11

## 2022-11-22 RX ADMIN — GEMFIBROZIL 600 MG: 600 TABLET ORAL at 09:11

## 2022-11-22 RX ADMIN — SODIUM CHLORIDE, POTASSIUM CHLORIDE, SODIUM LACTATE AND CALCIUM CHLORIDE 1000 ML: 600; 310; 30; 20 INJECTION, SOLUTION INTRAVENOUS at 09:11

## 2022-11-22 RX ADMIN — HEPARIN SODIUM 5000 UNITS: 5000 INJECTION, SOLUTION INTRAVENOUS; SUBCUTANEOUS at 02:11

## 2022-11-22 RX ADMIN — HEPARIN SODIUM 5000 UNITS: 5000 INJECTION, SOLUTION INTRAVENOUS; SUBCUTANEOUS at 09:11

## 2022-11-22 RX ADMIN — POTASSIUM CHLORIDE 10 MEQ: 7.46 INJECTION, SOLUTION INTRAVENOUS at 10:11

## 2022-11-22 RX ADMIN — POTASSIUM CHLORIDE 10 MEQ: 7.46 INJECTION, SOLUTION INTRAVENOUS at 09:11

## 2022-11-22 RX ADMIN — OSELTAMIVIR PHOSPHATE 30 MG: 30 CAPSULE ORAL at 09:11

## 2022-11-22 RX ADMIN — ASPIRIN 325 MG ORAL TABLET 325 MG: 325 PILL ORAL at 09:11

## 2022-11-22 RX ADMIN — HUMAN ALBUMIN MICROSPHERES AND PERFLUTREN 0.44 MG: 10; .22 INJECTION, SOLUTION INTRAVENOUS at 09:11

## 2022-11-22 RX ADMIN — ATORVASTATIN CALCIUM 80 MG: 40 TABLET, FILM COATED ORAL at 09:11

## 2022-11-22 NOTE — NURSING
Patient has pulled out his IV X 3, c/o pain from the IV K+ even though rate was decreased several times. On  call team 3 notified and will order oral meds

## 2022-11-22 NOTE — NURSING
On-call team Dr Velazquez notified that MRI has posted and that pt remains without IV site or tele monitor due to removal by patient numerous times

## 2022-11-22 NOTE — NURSING
Patient has pulled out his IV X 6 today and refuses to wear tele monitor, I notified Dr Haque, he really had little respond and said he will see what to do. I informed him that I would not restart his IV because I cannot watch him and it will be the same result.

## 2022-11-22 NOTE — PT/OT/SLP EVAL
"[facility]  Cranial Nerve Exam and Clinical Swallow Exam    Name: Hernando Chung   MRN: [id number]    Therapy Diagnosis: WFL oral and pharyngeal phases of the swallow     Physician: UMA Harrell    Date of Evaluation:  11/22/2022    Time In:  1100  Time Out:  1115  Total Billable Time: 15     Procedure Min.   Swallow and Oral Function Evaluation   15     Chief Complaint: No complaints of dysphagia, Pt reported "they don't ask what I want    General Information:  Affect: Alert  Cooperative  Oxygen:  room air  Hearing: WFL  Orientation:Person, Place, Time     Objective:       Cranial Nerve 5: Trigeminal Nerve  Motor Jaw Posture at rest: Closed  Mandible Elevation/Depression: WFL  Mandible lateralization: Unable to lateralize right  Abnormal movement: absent Interpretation:   intact   Sensory Forehead: WFL  Cheek: WFL  Jaw: WFL  Facial Pain: None noted Interpretation:   intact     Cranial Nerve 7: Facial Nerve  Motor Facial Symmetry: WNL  Wrinkle Forehead: WFL  Close eyes tightly: WFL  Labial Protrusion: WFL  Labial Retraction: WFL  Buccal Strength with Labial Seal: WFL  Abnormal movement: absent Interpretation:   intact   Sensory Formal testing not completed. Patient denied any changes in taste      Cranial Nerves IX and X: Glossopharyngeal and Vagus Nerves  Motor Palatal Symmetry (Rest):  DNT  Palatal Symmetry (Movement):  DNT  Cough: Perceptually strong  Voice Prior to PO intake: Clear  Resonance: Normal  Abnormal movement: absent Interpretation:   intact     Cranial Nerve XII: Hypoglossal Nerve  Motor Tongue at rest: WNL  Lingual Protrusion: WNL  Lingual Protrusion against Resistance: WNL  Lingual Lateralization: WNL  Abnormal movement: absent Interpretation:   intact     Other information:  Volitional Swallow: Able to palpate laryngeal rise  Mucosal Quality: No abnormal findings  Secretion Management: Secretion Mgmt: adequate  Dentition: Edentulous     Predisposing dysphagia risk factors: " NA  Clinical signs of possible chronic dysphagia: none noted  Precipitating dysphagia risk factors: NA    Pain:   0/10  Pain Location / Description: NA    Assessment :     [place] Swallow Protocol:  The [place] Swallow Protocol was administered. The patient was alert and provided the instructions prior to the beginning of the protocol. The patient consumed 3 oz before putting the cup down. Patient drank with consecutive swallows. Patient with no cough, no throat clear, no wet vocal quality. Patient without overt signs or symptoms of penetration/aspiration. Patient  passed the screener.    [place] Swallow Protocol dictates patient remain NPO if fail screener; (Denishar et al. 2014) however, an objective swallow assessment is not available at this time, patient will remain NPO until objective assessment is completed unless otherwise indicated. SLP is recommending MBSS assess swallow effectiveness, ID/rule out penetration and aspiration, make appropriate recommendations regarding safest diet consistency, effective compensatory strategies and safe eating environment.     PO Trials:   Patient presented with:   ICE CHIPS: NA  THIN:-self regulated thin liquid via cup  PUREE: self regulated  MINCED AND MOIST: NA  SOFT AND BITE SIZED: self regulated  SOLID: self regulated    *Thicken liquids were not used in this assessment. OErlin (2018) reported that thickened liquids have no sound evidence as reducing risk of pneumonia in patients with dysphagia and can cause harm by increasing risk of dehydration. It also presents an increased risk of UTI, electrolyte imbalance, constipation, fecal impaction, cognitive impairment, functional decline, and even death (Langmore, 2002; Sammy, 2016).  This supports the assertion that we should confirm a patient requires thickened liquids with an instrumental swallow study prior to recommending them.    Limitations:  NA    Interpretation:     Mr. Chung exhibited functional swallowing skills on this  assessment. The patient had no anterior loss of the bolus with adequate closure of the lips around the spoon, straw and bolus. No residue remained in the oral cavity following the swallow. Patient without overt clinical signs/symptoms of aspiration on any PO trials given. SLP is recommending regular diet with thin liquids at this time. SLP intervention is not warranted at this time. Please feel free to re-consult as warranted.  SLP educated pt and nurseAlok on CSE results and diet recommendations. SLP to follow up x 1 regarding diet tolerance.     Education:  SLP role in care, Plan of care, Results of the study, and Recommendations     Barriers to Learning:  NA    Teaching Method: Verbal    Plan:     LONG TERM GOAL    1.Hernando Chung will tolerate Regular consistency diet with Thin liquids without overt s/sx of aspiration to maintain appropriate nutrition and hydration.  Initial       Recommendations:     Consistency Recommendations:  Thin liquids (IDDSI 0) and Regular consistencies (IDDSI 7).   Precautions:frequent oral care  Risk Management: use good oral hygiene , sit upright for all PO intake, and increase physical mobility as tolerated  Specialist Referrals: NA  Ancillary Tests: Consider N/a   Therapy: Dysphagia therapy is not recommended at this time.  Follow-up exam: Follow up swallow study is not indicated at this time.    The Functional Oral Intake Scale (FOIS) is an ordinal scale that is used to assess the current status and meaningful change in the oral intake. FOIS levels include:        TUBE DEPENDENT   (Levels 1-3) 1. No oral intake    2. Tube dependent with minimal/inconsistent oral intake    3. Tube supplements with consistent oral intake          TOTAL ORAL INTAKE (Levels 4-7) 4. Total oral intake of a single consistency    5. Total oral intake of multiple consistencies requiring special preparation    6. Total oral intake with no special preparation, but must avoid specific foods or liquid  items    7. Total oral intake with no restrictions   Patient is currently judged to be at FOIS Level 7       Reference:   American Speech-Language-Hearing Association. (n.d.b). Adult dysphagia. (Practice Portal). https://www.gary.org/practice-portal/clinical-topics/adult-dysphagia/  NAYAN Varghese (2018). Use of modified diets to prevent aspiration in oropharyngeal dysphagia: Is current practice justified?. BMC Geriatrics, 18(1), 1-10.  NAYAN Yanez., SHADY Cox, HAIDER Pierce, & SANDER Betts (2002). Predictors of aspiration pneumonia in nursing home residents.  Dysphagia, 17(4), 298-307.  TRUDY Christianson. (2016). Best practices for dehydration prevention. Perspectives of the [place] Special Interest Groups - SIG 13, 1(2), 72- 80.    Didi Quintero MS, CCC-SLP  11/22/2022

## 2022-11-22 NOTE — PT/OT/SLP PROGRESS
SLP attempting CSE. However, patient currently NPO pending medical assessment/procedure per Alok bradley. SLP to re-attempt as schedule permits.

## 2022-11-22 NOTE — PROGRESS NOTES
Contacted Rae with [facility] ([phone]) who agreed to fax medical records to [phone].

## 2022-11-22 NOTE — PROGRESS NOTES
[facility] Medicine Wards Progress Note     Resident Team: [facility] Medicine List 3  Attending Physician: Mario Alberto Farrar MD    Subjective:      Brief HPI:  Hernando Chung is a 62 y.o. male who  has a past medical history of Schizophrenia, unspecified.  He presented to [facility] on 11/21/2022  with a primary complaint of Hypotension (C/o cough, congestion, chest pain, SOB, generalized weakness, no appetite x2 weeks. Hypotensive and low o2 sat in triage. )  .  Patient presents to the emergency department for 3 days of not eating or drinking.  And increased cough and congestion during the same time.  Information is obtained from patient's sister, patient is schizophrenic and currently taking medication for it.  However on my interview patient understands that he is in a hospital, that he knows the year, and he knows that he is in [place].  Sister says that he presents with generalized weakness, and some chest pain secondary to a cough.  Patient goes to [facility], patient's sister is unaware of what medications he is currently taking however she knows that he is on quite a bit of medication.  Patient says that he has some dizziness, and some chest pain, and some congestion.  Patient denies any issues with urination however he had just urinated on himself a few minutes before had walked into the room.  Patient has been having some slurring of his words per patient's sister that had started with all this happened proximally 3 days ago.  And also been having some dizziness and unsteadiness when standing.         Interval History:   Patient was seen examined this morning, patient says he is no complaints at this time.  Patient says he has been having some trouble sleeping.  However he denies any chest pain, palpitations, shortness of breath or wheezing.  Patient had his echocardiogram which showed no physiologic shunting and ultrasound bilateral Doppler carotid showed no meaningful stenosis or blockage.  Patient's  blood pressure has responded clinically to holding patient's antihypertensives.  Patient undergo MRI today      Review of Systems:  14 point ROS completed and negative except as indicated above.     Objective:     Vital Signs:  Vital Signs (Most Recent):  Temp: 98.4 °F (36.9 °C) (11/22/22 1156)  Pulse: 89 (11/22/22 1156)  Resp: 20 (11/21/22 1948)  BP: 138/78 (11/22/22 1156)  SpO2: (!) 91 % (11/22/22 1156)   Vital Signs (24h Range):  Temp:  [98.1 °F (36.7 °C)-98.7 °F (37.1 °C)] 98.4 °F (36.9 °C)  Pulse:  [75-91] 89  Resp:  [18-30] 20  SpO2:  [90 %-100 %] 91 %  BP: ()/(53-79) 138/78   Body mass index is 28.31 kg/m².     Intake/Output Summary (Last 24 hours) at 11/22/2022 1319  Last data filed at 11/21/2022 1555  Gross per 24 hour   Intake 3119 ml   Output --   Net 3119 ml       Physical Examination:  General:  Well developed, well nourished, no acute respiratory distress  Head: Normocephalic, atraumatic  Eyes: PERRL, EOMI, anicteric sclera. Xanthomas noticeable  Throat: No posterior pharyngeal erythema or exudate, no tonsillar exudate  Neck: supple, normal ROM, no JVD  CVS:  RRR, S1 and S2 normal, no murmurs, no added heart sounds, rubs, gallops, regular peripheral pulses, and no peripheral edema  Resp:  Lungs clear to auscultation bilaterally, no wheezes, rales, or rhonci  GI:  Abdomen soft, non-tender, non-distended, normoactive bowel sounds  MSK:  Full range of motion, no obvious deformities   Skin:  No rashes, ulcers, erythema  Neuro:  Alert and oriented x2, CNII-XII grossly intact, patient has finger-nose-finger positive on left side and on the right side it is less.  Negative for dysdiadochokinesia.  Both proximal and distal muscles in the upper and lower extremities is 5/5.  When I stent patient up patient is unsteady, patient's blood pressure dropped by 15 systolic when standing from lying down.  Patient is unsteady when standing and Romberg positive.  Patient is unable to ambulate without falling.   Patient is unsteady on his feet.  Psych:  Appropriate mood,        Laboratory:    Recent Labs   Lab 11/22/22  0338   WBC 9.1   HGB 11.8*   HCT 36.1*   *   MCV 91.6   RDW 13.1     Recent Labs   Lab 11/21/22  1308   TROPONINI 0.011   BNP <10.0      Recent Labs   Lab 11/22/22  0338      K 2.9*   CHLORIDE 101   CO2 28   BUN 35.8*   CREATININE 1.51*   CALCIUM 9.5     Recent Labs   Lab 11/22/22  0338   ALBUMIN 2.7*   BILITOT 0.3   AST 43*   ALKPHOS 109   ALT 31            Microbiology Data:  Microbiology Results (last 7 days)       Procedure Component Value Units Date/Time    Blood culture x two cultures. Draw prior to antibiotics. [[id number]] Collected: 11/21/22 1308    Order Status: Resulted Specimen: Blood from Hand, Right Updated: 11/21/22 1404    Blood culture x two cultures. Draw prior to antibiotics. [[id number]] Collected: 11/21/22 1320    Order Status: Resulted Specimen: Blood from Hand, Left Updated: 11/21/22 1404               Radiology:  CT Head Without Contrast 11/21/2022    Narrative  EXAMINATION:  CT HEAD WITHOUT CONTRAST    CLINICAL HISTORY:  Mental status change, unknown cause;    TECHNIQUE:  Multiple axial images were obtained from the base of the brain to the vertex without contrast administration.  Sagittal and coronal reconstructions were performed. .Automatic exposure control  (AEC) is utilized to reduce patient radiation exposure.    COMPARISON:  02/11/2012    FINDINGS:  There is no intracranial mass or lesion seen.  No hemorrhage is seen.  There is area of hypoattenuation in the cerebellum on the left side consistent with ischemia of undetermined age.  There is a punctate area of old lacunar infarct seen in the basal ganglia on the left.  There is a area of hypoattenuation in the basal ganglia on the right consistent with focal area ischemia of undetermined age..  The ventricles and basilar cisterns appear normal.  Brain parenchyma appears grossly unremarkable.    Posterior fossa  appears normal.  The calvarium is intact.  The paranasal sinuses shows evidence of ethmoid sinusitis and mild mucosal thickening in the sphenoid and frontal sinus..    Impression  Punctate area of hypoattenuation in the basal ganglia on the right consistent with ischemia of undetermined age    Hypoattenuated area in the cerebellum on the left side consistent with ischemia of indeterminate age    Old lacunar infarct in the basal ganglia on the left    This patient would benefit from an MRI for  correlation      Electronically signed by: Edelmira East  Date:    11/21/2022  Time:    16:18       Current Medications:     Infusions:   sodium chloride 0.9% 100 mL/hr at 11/21/22 1911         Scheduled:   aspirin  325 mg Oral Daily    atorvastatin  80 mg Oral Daily    cefTRIAXone (ROCEPHIN) IVPB  1 g Intravenous Q24H    citalopram  20 mg Oral QAM    cloZAPine  100 mg Oral QHS    cloZAPine  100 mg Oral QHS    divalproex  1,500 mg Oral QHS    gemfibroziL  600 mg Oral QAM    heparin (porcine)  5,000 Units Subcutaneous Q8H    oseltamivir  30 mg Oral Daily    perflutren protein-a microsphr  2 mL Intravenous Once    potassium chloride  10 mEq Intravenous Q1H         PRN:   dextrose 10%    dextrose 10%    dextrose 50%    dextrose 50%    glucagon (human recombinant)    glucagon (human recombinant)    glucose    glucose    glucose    glucose    insulin aspart U-100    naloxone    sodium chloride 0.9%        Antibiotics and Day Number of Therapy:  Antibiotics (From admission, onward)      Start     Stop Route Frequency Ordered    11/21/22 2045  cefTRIAXone (ROCEPHIN) 1 g in sodium chloride 0.9 % 50 mL IVPB         -- IV Every 24 hours (non-standard times) 11/21/22 1941                 Assessment & Plan:   Stroke of indeterminate age:  -echocardiogram with bubble showed no intracardiac shunting, CV ultrasound showed no meaningful stenosis or plaques.    -Patient has hypertriglyceridemia, will continue patient's Zetia and  gemfibrozil.  Will continue atorvastatin 80 and aspirin 325.  -patient will undergo an MRI.  And will work with physical therapy.       Acute kidney injury:  -MRSA PCR is negative   -will continue Rocephin   -creatinine has improved with IV fluid resuscitation will give a bolus and put patient on a maintenance rate.     Schizophrenia:  Altered mental status:  -patient has been having schizophrenia since he was 18 years old patient is on quite a bit of medication and may have some aspect of polypharmacy.  Patient is on clozapine, clonazepam, Celexa, Depakote     Hypotension:   Likely secondary to polypharmacy patient is on atenolol, chlorthalidone, ramipril.  Will hold medications and get some orthostatics     Influenza:  -start renally dosed Tamiflu     Diabetes mellitus:   Patient was on Actos, Tradjenta,  Sliding scale insulin in the meantime     History of prostate cancer:   -patient had a robotic prostatectomy in 2019 there were no metastasis noted on PET scan.  Will get a PSA         CODE STATUS:  Full code  Access:  IV  Antibiotics:  Rocephin, patient was given vancomycin and Zosyn in the ED  Diet:  Diabetic  DVT Prophylaxis:  Heparin 5 K  GI Prophylaxis:  none  Fluids:   cc /hr      Disposition:  Secondary workup is thus far noncontributory, will get MRI.  Work with physical therapy.  Continue aspirin, atorvastatin.  Will send patient with a heart monitor       Kirit Harrell MD  Internal Medicine Resident PGY-II

## 2022-11-23 NOTE — NURSING
"Dr Velazquez here to speak with sister, after exiting room he stated "if no family is able to prove POA then he cannot leave, she cannot sign him out" Sister is now threatening to call a  and stated " you will need more than security to hold me here.  "

## 2022-11-23 NOTE — SIGNIFICANT EVENT
Notified by nursing staff that sister is on unit and unhappy regarding patient's care. I went speak with sister at bedside. She is angry because she feels that we are not providing the best care for patient. States that she does not understand why the white board in the room was not updated today, angry that it does not contain his nurse's name. She does not know who has power of  over patient and is therefor unable to sign patient out against medical advice. While I was in room she called patient's other sibling (brother) but again he as well does not know who has power of . She stated that when brother arrives at the hospital, they will take patient regardless of consequences. I made sure she was aware that security would be called if she attempts to leave. I explained the results of patient's MRI today, she would like another opinion. Sister repeatedly states that medical tyranny is being committed. She is well aware of the risks of leaving.    1928: received a call from nursing staff that sister removed patient from hospital and was told by House Supervisor that they were okay to leave.     Jerman Velazquez MD  [facility] Family Medicine - PGY-1

## 2022-11-23 NOTE — PT/OT/SLP DISCHARGE
Speech Language Pathology Discharge Summary    Hernando Chung  MRN: [id number]   There is no problem list on file for this patient.      Date of Last Treatment Session: 11/22/2022    Past Medical History:   Diagnosis Date    Schizophrenia, unspecified        Status at initiation of therapy:Functional oral and pharyngeal phases of swallowing    Treatment Area(s):  Swallow    Goals:   Multidisciplinary Problems       SLP Goals          Problem: SLP    Goal Priority Disciplines Outcome   SLP Goal     SLP Unable to Meet, Plan Revised   Description: Pt will tolerate Regular consistency diet with thin liquids without overt s/sx of aspiration.                          Participation in Treatment (at discharge):  Unexpected DC. SLP unable to complete diet follow up.     Functional Status at time of Discharge:    Swallow: Patient demonstrates no dysphagia.                     Clinical Bedside assessment was completed on 11/22/2022                       Instrumental assessment was not completed                      Patient is discharged to Home unexpectedly per family    Didi Quintero MS, CCC-SLP

## 2022-11-23 NOTE — NURSING
"Sister at the desk, wanting to know what she is waiting for. I told her that I have spoken to Dr Velazquez but I will call him again. She stated " im not waiting long, im taking him and we are leaving." Dr Velazquez notified again and stated he will be up shortly after reviewing the chart. Family member notified and again stated " im not waiting because nothing is being done, he is not even hooked up to anything" I again told her that he has pulled out a total of 8 IV"s, pulled off tele monitor several times and has stopped up/ overflowed the toilet x 3. He also had his MRI but I am unable to give her the results it must come from the doctor.Her response was " whatever im taking him out this place"  "

## 2022-11-23 NOTE — NURSING
Susan Dunn RN stated that she called  to let Dr Velazquez know that 407 left per wheelchair with the sister.

## 2022-11-23 NOTE — NURSING
"I enter room, patient was laying on the bed, sister sitting in chair, I then asked patient " what would you like to do" Mr Chung stated " I want to go home" His sister then placed him in a wheelchair and left against the advise of the physician and did not sign AMA form.  "

## 2022-11-23 NOTE — NURSING
"Nursing supervisor Javier ROSA,RN here on unit, explained situation to him. He stated " if the patient is not a danger to others, to himself or is not PEC'd and he states that he wants to leave that we cannot stop him from leaving with anyone."  "

## 2022-11-23 NOTE — NURSING
"One of sisters complaints was that she could not reach the patient because he did not hang up the phone after the last time he used it and that no one updated her on what was going on, nor was there anything written on the white board. I told her that I had no way of knowing that his phone was off the hook and that she could have requested to be connected to the nurses station and I would have been happy to speak to her. As for the white board, after he pulled out his IV the last time, there was blood through out the room; on the bed and rails, in the bathroom, the floor and on the walls and board I did wipe clean his white board and did not re-write it after cleaning it. She kept stating that no one has told her what is going on. I asked if she was aware that he had tested positive for the flu and she stated " yes, I know that but what is going on" I again told her that I would have the on-call team to come up to answer her questions and she stated " well cant you? And there is nothing on the board". I again explained that I did not rewrite the board after cleaning blood spatter off it and that I would prefer if the physician answered her questions.  "

## 2022-11-23 NOTE — PT/OT/SLP PROGRESS
POST DISCHARGE DOCUMENTATION - 0804-11/23/2022    Missed Treatment Session - cancel note and D/C Note      Physical Therapy      Patient Name:  Hernando Chung   MRN:  [id number]    Patient not seen today secondary to Patient discharged prior to initiation of evaluation (patient left AMA-1918-11/22/2022).

## 2022-11-24 LAB
LEFT CBA DIAS: 17 CM/S
LEFT CBA SYS: 67 CM/S
LEFT CCA DIST DIAS: 25 CM/S
LEFT CCA DIST SYS: 106 CM/S
LEFT CCA MID DIAS: 27 CM/S
LEFT CCA MID SYS: 103 CM/S
LEFT CCA PROX DIAS: 25 CM/S
LEFT CCA PROX SYS: 72 CM/S
LEFT ECA DIAS: 23 CM/S
LEFT ECA SYS: 163 CM/S
LEFT ICA DIST DIAS: 36 CM/S
LEFT ICA DIST SYS: 98 CM/S
LEFT ICA MID DIAS: 30 CM/S
LEFT ICA MID SYS: 98 CM/S
LEFT ICA PROX DIAS: 17 CM/S
LEFT ICA PROX SYS: 70 CM/S
LEFT VERTEBRAL DIAS: 18 CM/S
LEFT VERTEBRAL SYS: 53 CM/S
OHS CV CAROTID RIGHT ICA EDV HIGHEST: 25
OHS CV CAROTID ULTRASOUND LEFT ICA/CCA RATIO: 0.92
OHS CV CAROTID ULTRASOUND RIGHT ICA/CCA RATIO: 0.75
OHS CV PV CAROTID LEFT HIGHEST CCA: 106
OHS CV PV CAROTID LEFT HIGHEST ICA: 98
OHS CV PV CAROTID RIGHT HIGHEST CCA: 96
OHS CV PV CAROTID RIGHT HIGHEST ICA: 72
OHS CV US CAROTID LEFT HIGHEST EDV: 36
RIGHT CBA DIAS: 21 CM/S
RIGHT CBA SYS: 65 CM/S
RIGHT CCA DIST DIAS: 27 CM/S
RIGHT CCA DIST SYS: 96 CM/S
RIGHT CCA MID DIAS: 24 CM/S
RIGHT CCA MID SYS: 87 CM/S
RIGHT CCA PROX DIAS: 18 CM/S
RIGHT CCA PROX SYS: 84 CM/S
RIGHT ECA DIAS: 18 CM/S
RIGHT ECA SYS: 96 CM/S
RIGHT ICA DIST DIAS: 14 CM/S
RIGHT ICA DIST SYS: 72 CM/S
RIGHT ICA MID DIAS: 25 CM/S
RIGHT ICA MID SYS: 72 CM/S
RIGHT ICA PROX DIAS: 15 CM/S
RIGHT ICA PROX SYS: 70 CM/S
RIGHT VERTEBRAL DIAS: 25 CM/S
RIGHT VERTEBRAL SYS: 72 CM/S

## 2022-11-24 NOTE — ED PROVIDER NOTES
Encounter Date: 11/21/2022       History     Chief Complaint   Patient presents with    Hypotension     C/o cough, congestion, chest pain, SOB, generalized weakness, no appetite x2 weeks. Hypotensive and low o2 sat in triage.      62-year-old male presents to ED for multiple complaints.  His primary concern is generalized weakness. states over the last several days he has been more weak. specifically when he attempts to ambulate has dizziness. denies falling or hurting himself. intermittent confusion over the last several days worse than baseline.  Patient also endorses generalized muscle aches / myalgias.  No reported fever or chills.  No nausea or vomiting.  No abdominal discomfort.  Patient does endorse some shortness of breath but denies any chest pain or pressure.  No known sick contacts.  No other complaints or concerns at this time.  Did not endorse other complaints stated in triage note.    Review of patient's allergies indicates:  No Known Allergies  Past Medical History:   Diagnosis Date    Schizophrenia, unspecified      History reviewed. No pertinent surgical history.  History reviewed. No pertinent family history.     Review of Systems   Constitutional:  Positive for fatigue. Negative for chills and fever.   HENT:  Negative for congestion, rhinorrhea and sore throat.    Eyes:  Negative for pain, discharge and itching.   Respiratory:  Positive for shortness of breath. Negative for chest tightness, wheezing and stridor.    Cardiovascular:  Negative for chest pain and palpitations.   Gastrointestinal:  Negative for abdominal distention, abdominal pain, constipation, diarrhea, nausea and vomiting.   Genitourinary:  Negative for dysuria and hematuria.   Musculoskeletal:  Positive for myalgias. Negative for neck pain and neck stiffness.   Skin:  Negative for color change and rash.   Neurological:  Positive for dizziness and weakness. Negative for headaches.   Psychiatric/Behavioral:  Negative for confusion.  The patient is not hyperactive.      Physical Exam     Initial Vitals [11/21/22 1248]   BP Pulse Resp Temp SpO2   (!) 88/58 77 20 97.3 °F (36.3 °C) (!) 86 %      MAP       --         Physical Exam    Constitutional: He is not diaphoretic. No distress.   HENT:   Head: Normocephalic and atraumatic.   Eyes: Conjunctivae and EOM are normal. Pupils are equal, round, and reactive to light.   Neck: Neck supple. No tracheal deviation present.   Normal range of motion.  Cardiovascular:  Normal rate, regular rhythm and normal heart sounds.           Pulmonary/Chest: Breath sounds normal. No respiratory distress.   Abdominal: Abdomen is soft. There is no abdominal tenderness. There is no rebound.   Musculoskeletal:         General: No tenderness or edema. Normal range of motion.      Cervical back: Normal range of motion and neck supple.     Neurological: He has normal strength. He displays no atrophy and no tremor. No cranial nerve deficit or sensory deficit. He exhibits normal muscle tone. He displays no seizure activity. GCS score is 15. GCS eye subscore is 4. GCS verbal subscore is 5. GCS motor subscore is 6.   A&Ox2. Unsteady in room on feet. Generalized mild weakness of all extremities. Symmetric.    Skin: Skin is warm and dry. Capillary refill takes less than 2 seconds. No rash noted.   Psychiatric: He has a normal mood and affect. His behavior is normal. Judgment and thought content normal.       ED Course   Critical Care    Date/Time: 11/22/2022 6:14 PM  Performed by: Navid Ramos MD  Authorized by: Navid Ramos MD   Total critical care time (exclusive of procedural time) : 60 minutes  Critical care time was exclusive of separately billable procedures and treating other patients.  Critical care was time spent personally by me on the following activities: evaluation of patient's response to treatment, obtaining history from patient or surrogate, ordering and review of laboratory studies, pulse oximetry, review of old  charts, development of treatment plan with patient or surrogate, examination of patient, ordering and performing treatments and interventions, ordering and review of radiographic studies and re-evaluation of patient's condition.  Comments: Hypotension, altered.      Labs Reviewed   COMPREHENSIVE METABOLIC PANEL - Abnormal; Notable for the following components:       Result Value    Chloride 93 (*)     Glucose Level 146 (*)     Blood Urea Nitrogen 52.1 (*)     Creatinine 3.21 (*)     Calcium Level Total 10.4 (*)     Protein Total 7.7 (*)     Globulin 4.2 (*)     Albumin/Globulin Ratio 0.8 (*)     Aspartate Aminotransferase 44 (*)     All other components within normal limits   URINALYSIS, REFLEX TO URINE CULTURE - Abnormal; Notable for the following components:    Appearance, UA Turbid (*)     Protein, UA Trace (*)     Blood, UA 2+ (*)     WBC, UA 6-10 (*)     Squamous Epithelial Cells, UA Trace (*)     Mucous, UA Few (*)     Hyaline Casts, UA 0-2 (*)     All other components within normal limits   COVID/RSV/FLU A&B PCR - Abnormal; Notable for the following components:    Influenza A PCR Detected (*)     All other components within normal limits    Narrative:     The Xpert Xpress SARS-CoV-2/FLU/RSV plus is a rapid, multiplexed real-time PCR test intended for the simultaneous qualitative detection and differentiation of SARS-CoV-2, Influenza A, Influenza B, and respiratory syncytial virus (RSV) viral RNA in either nasopharyngeal swab or nasal swab specimens.         CBC WITH DIFFERENTIAL - Abnormal; Notable for the following components:    WBC 17.1 (*)     RBC 4.54 (*)     Hgb 13.7 (*)     MCHC 32.6 (*)     MPV 12.2 (*)     Neut # 14.0 (*)     Mono # 1.79 (*)     IG# 0.20 (*)     All other components within normal limits   DRUG SCREEN, URINE (BEAKER) - Abnormal; Notable for the following components:    Fentanyl, Urine Positive (*)     All other components within normal limits    Narrative:     Cut off concentrations:     Amphetamines - 1000 ng/ml  Barbiturates - 200 ng/ml  Benzodiazepine - 200 ng/ml  Cannabinoids (THC) - 50 ng/ml  Cocaine - 300 ng/ml  Fentanyl - 1.0 ng/ml  MDMA - 500 ng/ml  Opiates - 300 ng/ml   Phencyclidine (PCP) - 25 ng/ml    Specimen submitted for drug analysis and tested for pH and specific gravity in order to evaluate sample integrity. Suspect tampering if specific gravity is <1.003 and/or pH is not within the range of 4.5 - 8.0  False negatives may result form substances such as bleach added to urine.  False positives may result for the presence of a substance with similar chemical structure to the drug or its metabolite.    This test provides only a PRELIMINARY analytical test result. A more specific alternate chemical method must be used in order to obtain a confirmed analytical result. Gas chromatography/mass spectrometry (GC/MS) is the preferred confirmatory method. Other chemical confirmation methods are available. Clinical consideration and professional judgement should be applied to any drug of abuse test result, particularly when preliminary positive results are used.    Positive results will be confirmed only at the physicians request. Unconfirmed screening results are to be used only for medical purposes (treatment).        FERRITIN - Abnormal; Notable for the following components:    Ferritin Level 930.98 (*)     All other components within normal limits   IRON AND TIBC - Abnormal; Notable for the following components:    Iron Level 25 (*)     Iron Binding Capacity Total 241 (*)     Iron Saturation 10 (*)     All other components within normal limits   LIPID PANEL - Abnormal; Notable for the following components:    Cholesterol Total 216 (*)     HDL Cholesterol 30 (*)     Triglyceride 252 (*)     Cholesterol/HDL Ratio 7 (*)     All other components within normal limits   PROTEIN / CREATININE RATIO, URINE - Abnormal; Notable for the following components:    Urine Creatinine 247.4 (*)     All other  components within normal limits    Narrative:     Unable to calculate urine Protein/Creatinine Ratio.   LACTIC ACID, PLASMA - Normal   B-TYPE NATRIURETIC PEPTIDE - Normal   TROPONIN I - Normal   TSH - Normal   LACTIC ACID, PLASMA - Normal   HIV 1 / 2 ANTIBODY - Normal   VITAMIN B12 - Normal   FOLATE - Normal   CBC W/ AUTO DIFFERENTIAL    Narrative:     The following orders were created for panel order CBC auto differential.  Procedure                               Abnormality         Status                     ---------                               -----------         ------                     CBC with Differential[[id number]]        Abnormal            Final result                 Please view results for these tests on the individual orders.   HEMOGLOBIN A1C   EXTRA TUBES    Narrative:     The following orders were created for panel order EXTRA TUBES.  Procedure                               Abnormality         Status                     ---------                               -----------         ------                     Light Blue Top Hold[[id number]]                              In process                 Red Top Hold[[id number]]                                     In process                   Please view results for these tests on the individual orders.   LIGHT BLUE TOP HOLD   RED TOP HOLD     EKG Readings: (Independently Interpreted)   Initial Reading: No STEMI. Rhythm: Normal Sinus Rhythm. Ectopy: No Ectopy. Conduction: Normal. Axis: Normal. Clinical Impression: Normal Sinus Rhythm   ECG Results              EKG 12-lead (Final result)  Result time 11/21/22 14:32:29      Final result by Interface, Lab In [facility] (11/21/22 14:32:29)                   Narrative:    Test Reason : R53.1,    Vent. Rate : 076 BPM     Atrial Rate : 076 BPM     P-R Int : 134 ms          QRS Dur : 090 ms      QT Int : 336 ms       P-R-T Axes : 041 074 047 degrees     QTc Int : 378 ms    Normal sinus rhythm  Nonspecific T wave  abnormality  Abnormal ECG  No previous ECGs available  Confirmed by Pee Montiel MD ([id number]) on 11/21/2022 2:32:18 PM    Referred By:             Confirmed By:Pee Montiel MD                                  Imaging Results              MRI Brain Without Contrast (Final result)  Result time 11/22/22 14:35:52      Final result by Edelmira East MD (11/22/22 14:35:52)                   Impression:      Chronic age related changes    Focal areas of punctate acute infarcts seen in the right and left basal ganglia as well as the cerebellum on the left and the right frontal region at the cerebral convexity near the midline.  The pattern and distribution suggest embolic phenomena.      Electronically signed by: Edelmira East  Date:    11/22/2022  Time:    14:35               Narrative:    EXAMINATION:  MRI BRAIN WITHOUT CONTRAST    CLINICAL HISTORY:  Stroke, follow up;    TECHNIQUE:  Multiplanar multisequence MR imaging of the brain was performed without contrast.    COMPARISON:  11/21/2022    FINDINGS:  No intracranial mass or lesion is seen.  There is of focal area of acute infarct in the cerebellum on the left side which corresponds to the area of hypoattenuation seen on CT scan.  There is a punctate focal area of acute infarct in the basal ganglia on the right side and the left side.  There is a punctate focal area of acute infarct at the cerebral convexity in the right frontal region near the midline.  It is seen on image number 23 series 3.  No hemorrhage is seen..  There is diffuse cerebral atrophy seen along with some compensatory ventricular dilatation and periventricular white matter change consistent with patient's age.  Posterior fossa appears normal.  Calvarium is intact.  Paranasal sinuses appear grossly unremarkable.                                       US Retroperitoneal Limited (Final result)  Result time 11/21/22 19:10:41      Final result by Stef Pierre MD (11/21/22 19:10:41)                    Impression:      No hydronephrosis.  Other findings above.      Electronically signed by: Stef Pierre  Date:    11/21/2022  Time:    19:10               Narrative:    EXAMINATION:  US RETROPERITONEAL LIMITED    CLINICAL HISTORY:  corky;    COMPARISON:  CT 10 January 2019    FINDINGS:  Grayscale and color Doppler sonographic evaluation of the kidneys and urinary bladder.    The right kidney measures 12.5 cm. The left kidney measures 13 cm.   No hydronephrosis.  Background renal parenchymal echogenicity is grossly normal.  There are small bilateral renal cysts for which no follow-up is needed.  Additionally few subcentimeter renal calcifications adjacent to the cysts are seen possibly nonobstructing calculi.    No significant abnormality of the urinary bladder.                                       CT Head Without Contrast (Final result)  Result time 11/21/22 16:18:48      Final result by Edelmira East MD (11/21/22 16:18:48)                   Impression:      Punctate area of hypoattenuation in the basal ganglia on the right consistent with ischemia of undetermined age    Hypoattenuated area in the cerebellum on the left side consistent with ischemia of indeterminate age    Old lacunar infarct in the basal ganglia on the left    This patient would benefit from an MRI for  correlation      Electronically signed by: Edelmira East  Date:    11/21/2022  Time:    16:18               Narrative:    EXAMINATION:  CT HEAD WITHOUT CONTRAST    CLINICAL HISTORY:  Mental status change, unknown cause;    TECHNIQUE:  Multiple axial images were obtained from the base of the brain to the vertex without contrast administration.  Sagittal and coronal reconstructions were performed. .Automatic exposure control  (AEC) is utilized to reduce patient radiation exposure.    COMPARISON:  02/11/2012    FINDINGS:  There is no intracranial mass or lesion seen.  No hemorrhage is seen.  There is area of hypoattenuation in  the cerebellum on the left side consistent with ischemia of undetermined age.  There is a punctate area of old lacunar infarct seen in the basal ganglia on the left.  There is a area of hypoattenuation in the basal ganglia on the right consistent with focal area ischemia of undetermined age..  The ventricles and basilar cisterns appear normal.  Brain parenchyma appears grossly unremarkable.    Posterior fossa appears normal.  The calvarium is intact.  The paranasal sinuses shows evidence of ethmoid sinusitis and mild mucosal thickening in the sphenoid and frontal sinus..                                       X-Ray Chest AP Portable (Final result)  Result time 11/21/22 13:29:59      Final result by Madonna Medina MD (11/21/22 13:29:59)                   Impression:      No acute abnormality of the chest.      Electronically signed by: Madonna Medina  Date:    11/21/2022  Time:    13:29               Narrative:    EXAMINATION:  XR CHEST AP PORTABLE    CLINICAL HISTORY:  Sepsis;    COMPARISON:  None    FINDINGS:  The heart is normal in size.  The lungs are clear without focal consolidation.  There is no pleural effusion or visible pneumothorax.                                       Medications   potassium chloride 10 mEq in 100 mL IVPB (10 mEq Intravenous Not Given 11/22/22 1300)   lactated ringers bolus 2,769 mL (0 mLs Intravenous Stopped 11/21/22 1434)   piperacillin-tazobactam (ZOSYN) 4.5 g in sodium chloride 0.9 % 100 mL IVPB (MB+) (0 g Intravenous Stopped 11/21/22 1454)   vancomycin (VANCOCIN) 1,000 mg in sodium chloride 0.9 % 250 mL IVPB (MB+) (0 mg Intravenous Stopped 11/21/22 1555)   lactated ringers bolus 1,000 mL (0 mLs Intravenous Stopped 11/22/22 1137)   perflutren protein-A microsphr 0.22 mg/mL IV susp (0.44 mg Intravenous Given 11/22/22 0905)     VAN: negative  Medical Decision Making:   Clinical Tests:   Lab Tests: Ordered and Reviewed  Radiological Study: Ordered and Reviewed  Medical Tests:  Ordered and Reviewed  ED Management:  62-year-old male presents to ED for multiple complaints including fatigue weakness myalgias confusions and ambulatory difficulties.  On arrival in no acute distress. Vitals stable except for initial hypotension. provided fluid resuscitation and pt felt better and pressure improved.  Physical exam demonstrated chronically malnourished patient.  had mild confusion. was alert and oriented x2 only.  No focal neuro deficits besides poor balance / coordination and  was unsteady on his feet bedside.  Otherwise no acute findings on physical exam.  Laboratory analysis demonstrated viral panel positive for flu A. Also had acute kidney injury. head CT demonstrated CVA of unknown duration.  Patient's symptoms have been ongoing for greater than 3 days and he was not within the tPA treatment window. NIH was 3 and initial VAN negative. At this time ultimately requires admission for stroke workup and further management by inpatient team. high fall risk.  Patient informed of all findings and ultimately care transitioned the inpatient medicine team.  Significant time was spent managing this patient's care in isolation and bedside. (Zmora)   Additional MDM:     NIH Stroke Scale:   Interval = baseline (upon arrival/admit)  Level of consciousness = 0 - alert  LOC questions = 1 - answers one correctly  LOC commands = 0 - performs both correctly  Best gaze = 0 - normal  Visual = 0 - no visual loss  Facial palsy = 0 - normal  Motor left arm =  0 - no drift  Motor right arm =  0 - no drift  Motor left leg = 0 - no drift  Motor right leg =  0 - no drift  Limb ataxia = 2 - present in two limbs  Sensory = 0 - normal  Best language = 0 - no aphasia  Dysarthria = 0 - normal articulation  Extinction and inattention = 0 - no neglect  NIH Stroke Scale Total = 3                     Clinical Impression:   Final diagnoses:  [R53.1] Weakness  [I95.9] Hypotension, unspecified hypotension type (Primary)  [J10.1]  Influenza A  [R41.82] Altered mental status, unspecified altered mental status type  [I63.9] Cerebrovascular accident (CVA), unspecified mechanism  [N17.9] MARY (acute kidney injury)        ED Disposition Condition    Observation                 Navid Ramos MD  12/01/22 [id number]

## 2022-11-26 LAB
BACTERIA BLD CULT: NORMAL
BACTERIA BLD CULT: NORMAL

## 2023-01-06 NOTE — DISCHARGE SUMMARY
[facility] Discharge Summary    Admitting Physician: Mario Alberto Farrar MD  Attending Physician: No att. providers found  Date of Admit: 11/21/2022  Date of Discharge: left ama    Discharge Diagnoses     There is no problem list on file for this patient.      Principal Problem:  <principal problem not specified>    Consultants and Procedures     Consultants:  IP CONSULT TO SOCIAL WORK/CASE MANAGEMENT    Procedures:   * No surgery found *     Brief Admission History      See significant event note, patient was not discharge but system forced me to type a discharge summary despite the fact.    Hospital Course with Pertinent Findings     TIME SPENT ON DISCHARGE: 60 minutes    Discharge Medications        Medication List        ASK your doctor about these medications      atenoloL 50 MG tablet  Commonly known as: TENORMIN     citalopram 20 MG tablet  Commonly known as: CeleXA     clonazePAM 1 MG tablet  Commonly known as: KlonoPIN     cloZAPine 100 MG Tab  Commonly known as: CLOZARIL     divalproex 500 MG Tb24     ezetimibe 10 mg tablet  Commonly known as: ZETIA     gemfibroziL 600 MG tablet  Commonly known as: LOPID     omeprazole 40 MG capsule  Commonly known as: PRILOSEC     pioglitazone 45 MG tablet  Commonly known as: ACTOS     potassium chloride SA 20 MEQ tablet  Commonly known as: K-DUR,KLOR-CON     PRENATAL VITAMIN 27 mg iron- 800 mcg Tab  Generic drug: prenatal vit no.130-iron-folic     ramipriL 10 MG capsule  Commonly known as: ALTACE     TRADJENTA 5 mg Tab tablet  Generic drug: linaGLIPtin     traZODone 100 MG tablet  Commonly known as: DESYREL              Discharge Information:     Left ama    Jerman Velazquez MD  [facility] - PGY-1

## 2024-07-15 ENCOUNTER — OFFICE VISIT (OUTPATIENT)
Dept: URGENT CARE | Facility: CLINIC | Age: 64
End: 2024-07-15
Payer: MEDICARE

## 2024-07-15 ENCOUNTER — HOSPITAL ENCOUNTER (OUTPATIENT)
Dept: RADIOLOGY | Facility: HOSPITAL | Age: 64
Discharge: HOME | End: 2024-07-15
Payer: MEDICARE

## 2024-07-15 VITALS
BODY MASS INDEX: 28.14 KG/M2 | RESPIRATION RATE: 20 BRPM | OXYGEN SATURATION: 95 % | TEMPERATURE: 100 F | HEART RATE: 88 BPM | WEIGHT: 201 LBS | DIASTOLIC BLOOD PRESSURE: 76 MMHG | HEIGHT: 71 IN | SYSTOLIC BLOOD PRESSURE: 135 MMHG

## 2024-07-15 DIAGNOSIS — U07.1 COVID-19 VIRUS DETECTED: ICD-10-CM

## 2024-07-15 DIAGNOSIS — R09.89 RHONCHI: ICD-10-CM

## 2024-07-15 DIAGNOSIS — R05.9 COUGH IN ADULT: ICD-10-CM

## 2024-07-15 DIAGNOSIS — U07.1 CLINICAL DIAGNOSIS OF COVID-19: Primary | ICD-10-CM

## 2024-07-15 LAB
CTP QC/QA: YES
SARS-COV-2 RDRP RESP QL NAA+PROBE: POSITIVE

## 2024-07-15 PROCEDURE — 25000003 PHARM REV CODE 250

## 2024-07-15 PROCEDURE — 99215 OFFICE O/P EST HI 40 MIN: CPT | Mod: PBBFAC,25

## 2024-07-15 PROCEDURE — 71046 X-RAY EXAM CHEST 2 VIEWS: CPT | Mod: TC

## 2024-07-15 PROCEDURE — 25000242 PHARM REV CODE 250 ALT 637 W/ HCPCS

## 2024-07-15 PROCEDURE — 99204 OFFICE O/P NEW MOD 45 MIN: CPT | Mod: S$PBB,,,

## 2024-07-15 PROCEDURE — 87635 SARS-COV-2 COVID-19 AMP PRB: CPT | Mod: PBBFAC

## 2024-07-15 RX ORDER — BENZONATATE 100 MG/1
100 CAPSULE ORAL 3 TIMES DAILY PRN
Qty: 30 CAPSULE | Refills: 0 | Status: SHIPPED | OUTPATIENT
Start: 2024-07-15 | End: 2024-07-25

## 2024-07-15 RX ORDER — ASPIRIN 81 MG/1
81 TABLET ORAL EVERY MORNING
COMMUNITY
Start: 2024-07-01

## 2024-07-15 RX ORDER — CHLORTHALIDONE 25 MG/1
12.5 TABLET ORAL
COMMUNITY
Start: 2024-07-01

## 2024-07-15 RX ORDER — HALOPERIDOL 20 MG/1
20 TABLET ORAL NIGHTLY
COMMUNITY
Start: 2024-07-01

## 2024-07-15 RX ORDER — ACETAMINOPHEN 325 MG/1
650 TABLET ORAL
Status: COMPLETED | OUTPATIENT
Start: 2024-07-15 | End: 2024-07-15

## 2024-07-15 RX ORDER — HALOPERIDOL 5 MG/1
5 TABLET ORAL 2 TIMES DAILY
COMMUNITY
Start: 2024-07-01

## 2024-07-15 RX ORDER — DOCUSATE SODIUM 100 MG/1
100 CAPSULE, LIQUID FILLED ORAL NIGHTLY
COMMUNITY
Start: 2024-07-01

## 2024-07-15 RX ORDER — ALBUTEROL SULFATE 1.25 MG/3ML
1.25 SOLUTION RESPIRATORY (INHALATION) EVERY 6 HOURS PRN
Qty: 30 EACH | Refills: 0 | Status: SHIPPED | OUTPATIENT
Start: 2024-07-15 | End: 2024-07-25

## 2024-07-15 RX ORDER — IPRATROPIUM BROMIDE AND ALBUTEROL SULFATE 2.5; .5 MG/3ML; MG/3ML
3 SOLUTION RESPIRATORY (INHALATION)
Status: COMPLETED | OUTPATIENT
Start: 2024-07-15 | End: 2024-07-15

## 2024-07-15 RX ORDER — NEBULIZER AND COMPRESSOR
1 EACH MISCELLANEOUS DAILY PRN
Qty: 1 EACH | Refills: 0 | Status: SHIPPED | OUTPATIENT
Start: 2024-07-15

## 2024-07-15 RX ORDER — MULTIVITAMIN/IRON/FOLIC ACID 18MG-0.4MG
1 TABLET ORAL
COMMUNITY
Start: 2024-07-01

## 2024-07-15 RX ORDER — CITALOPRAM 40 MG/1
40 TABLET, FILM COATED ORAL
COMMUNITY
Start: 2024-07-01

## 2024-07-15 RX ORDER — FOLIC ACID 1 MG/1
1000 TABLET ORAL
COMMUNITY
Start: 2024-05-06

## 2024-07-15 RX ORDER — ALBUTEROL SULFATE 90 UG/1
2 AEROSOL, METERED RESPIRATORY (INHALATION) EVERY 6 HOURS PRN
Qty: 18 G | Refills: 0 | Status: SHIPPED | OUTPATIENT
Start: 2024-07-15 | End: 2025-07-15

## 2024-07-15 RX ADMIN — ACETAMINOPHEN 650 MG: 325 TABLET, FILM COATED ORAL at 01:07

## 2024-07-15 RX ADMIN — IPRATROPIUM BROMIDE AND ALBUTEROL SULFATE 3 ML: .5; 3 SOLUTION RESPIRATORY (INHALATION) at 01:07

## 2024-07-15 NOTE — PROGRESS NOTES
"Subjective:       Patient ID: Hernando Chung is a 63 y.o. male.    Vitals:  height is 5' 11" (1.803 m) and weight is 91.2 kg (201 lb). His oral temperature is 99.6 °F (37.6 °C). His blood pressure is 135/76 and his pulse is 88. His respiration is 20 and oxygen saturation is 95%.     Chief Complaint: URI (Cough, headache, nasal congestion, sob, wheezing and fever. Patient is a poor historian)    63-year-old white male presents to clinic complaining of "feeling bad" x 1 year. States he decided to tell [employer] staff today. Has tried Robitussin with some improvement. Admits to heavy smoking habits.    URI   Associated symptoms include congestion, coughing and wheezing. Pertinent negatives include no chest pain, ear pain or sore throat.       Constitution: Positive for fever.   HENT:  Positive for congestion. Negative for ear pain and sore throat.    Cardiovascular:  Negative for chest pain.   Respiratory:  Positive for cough, sputum production, shortness of breath and wheezing. Negative for asthma.    Allergic/Immunologic: Negative for asthma.   Psychiatric/Behavioral:  Positive for history of mental illness.        Objective:      Physical Exam   Constitutional: He is oriented to person, place, and time. He is cooperative. He is easily aroused. He appears ill. awake  HENT:   Head: Normocephalic and atraumatic.   Ears:   Right Ear: Tympanic membrane normal.   Left Ear: Tympanic membrane normal.   Nose: Mucosal edema and rhinorrhea present.   Mouth/Throat: Uvula is midline, oropharynx is clear and moist and mucous membranes are normal.   Eyes: Lids are normal.   Neck: Neck supple.   Cardiovascular: Normal rate.   Pulmonary/Chest: Effort normal. He has wheezes. He has rhonchi.   Ronchi in upper lobes          Comments: Ronchi in upper lobes     Abdominal: Normal appearance.   Musculoskeletal:      Right lower leg: No edema.      Left lower leg: No edema.   Neurological: He is alert, oriented to person, place, and time " and easily aroused. Gait normal. GCS eye subscore is 4. GCS verbal subscore is 5. GCS motor subscore is 6.   Skin: Skin is warm, dry and intact. Capillary refill takes less than 2 seconds.   Psychiatric: His behavior is normal.   Nursing note and vitals reviewed.        Assessment:       1. Clinical diagnosis of COVID-19    2. Rhonchi    3. Cough in adult          X-Ray Chest PA And Lateral  Order: [id number]  Status: Final result       Visible to patient: No (inaccessible in MyChart)       Next appt: None       Dx: Upper respiratory tract infection, un...    0 Result Notes  Details    Reading Physician Reading Date Result Priority   Pete Beth MD  [phone] 7/15/2024 STAT     Narrative & Impression  EXAMINATION:  XR CHEST PA AND LATERAL     CLINICAL HISTORY:  Acute upper respiratory infection, unspecifiedwheezing;     TECHNIQUE:  Two-view     COMPARISON:  November 21, 2022.     FINDINGS:  Cardiopericardial silhouette is within normal limits.  There are chronic interstitial changes of the lungs without dense focal or segmental consolidation, congestion, pleural effusion or pneumothorax.     Impression:     No acute cardiopulmonary process identified.        Electronically signed by:Pete Beth  Date:                                            07/15/2024  Time:                                           12:53         Plan:     XR findings are normal. Patient has tested positive for COVID. Patient is poor historian, discussed findings with Melvina caregiver. Duo Neb admin with improvement in lung sounds. Strict ED precautions discussed with patient and caregiver. Appears stable for discharge. .    Clinical diagnosis of COVID-19    Rhonchi  -     nebulizer and compressor Izzy; 1 Device by Misc.(Non-Drug; Combo Route) route daily as needed (SOB).  Dispense: 1 each; Refill: 0  -     albuterol (ACCUNEB) 1.25 mg/3 mL Nebu; Take 3 mLs (1.25 mg total) by nebulization every 6 (six) hours as needed (wheezing). Rescue   Dispense: 30 each; Refill: 0  -     albuterol (VENTOLIN HFA) 90 mcg/actuation inhaler; Inhale 2 puffs into the lungs every 6 (six) hours as needed for Wheezing. Rescue  Dispense: 18 g; Refill: 0  -     albuterol-ipratropium 2.5 mg-0.5 mg/3 mL nebulizer solution 3 mL    Cough in adult  -     POCT COVID-19 Rapid Screening  -     X-Ray Chest PA And Lateral  -     acetaminophen tablet 650 mg  -     benzonatate (TESSALON) 100 MG capsule; Take 1 capsule (100 mg total) by mouth 3 (three) times daily as needed for Cough (cough).  Dispense: 30 capsule; Refill: 0           Results for orders placed or performed in visit on 07/15/24   POCT COVID-19 Rapid Screening   Result Value Ref Range    POC Rapid COVID Positive (A) Negative     Acceptable Yes

## 2025-03-30 ENCOUNTER — HOSPITAL ENCOUNTER (EMERGENCY)
Facility: HOSPITAL | Age: 65
Discharge: HOME | End: 2025-03-30
Attending: EMERGENCY MEDICINE
Payer: MEDICARE

## 2025-03-30 VITALS
WEIGHT: 174.94 LBS | HEIGHT: 71 IN | TEMPERATURE: 98 F | OXYGEN SATURATION: 98 % | RESPIRATION RATE: 14 BRPM | BODY MASS INDEX: 24.49 KG/M2 | DIASTOLIC BLOOD PRESSURE: 80 MMHG | HEART RATE: 60 BPM | SYSTOLIC BLOOD PRESSURE: 163 MMHG

## 2025-03-30 DIAGNOSIS — R53.1 WEAKNESS: ICD-10-CM

## 2025-03-30 DIAGNOSIS — E87.6 HYPOKALEMIA: ICD-10-CM

## 2025-03-30 DIAGNOSIS — R63.0 DECREASED APPETITE: ICD-10-CM

## 2025-03-30 DIAGNOSIS — E86.0 DEHYDRATION: Primary | ICD-10-CM

## 2025-03-30 DIAGNOSIS — F20.9 SCHIZOPHRENIA, UNSPECIFIED TYPE: ICD-10-CM

## 2025-03-30 DIAGNOSIS — T50.905A ADVERSE EFFECT OF DRUG, INITIAL ENCOUNTER: ICD-10-CM

## 2025-03-30 LAB
ACCEPTIBLE SP GR UR QL: >1.05 (ref 1–1.03)
ALBUMIN SERPL-MCNC: 4.1 G/DL (ref 3.4–4.8)
ALBUMIN/GLOB SERPL: 1.3 RATIO (ref 1.1–2)
ALP SERPL-CCNC: 88 UNIT/L (ref 40–150)
ALT SERPL-CCNC: 16 UNIT/L (ref 0–55)
AMPHET UR QL SCN: NEGATIVE
ANION GAP SERPL CALC-SCNC: 14 MEQ/L
AST SERPL-CCNC: 18 UNIT/L (ref 11–45)
BACTERIA #/AREA URNS AUTO: ABNORMAL /HPF
BARBITURATE SCN PRESENT UR: NEGATIVE
BASOPHILS # BLD AUTO: 0.03 X10(3)/MCL
BASOPHILS NFR BLD AUTO: 0.4 %
BENZODIAZ UR QL SCN: NEGATIVE
BILIRUB SERPL-MCNC: 0.9 MG/DL
BILIRUB UR QL STRIP.AUTO: NEGATIVE
BUN SERPL-MCNC: 8.5 MG/DL (ref 8.4–25.7)
CALCIUM SERPL-MCNC: 9.9 MG/DL (ref 8.8–10)
CANNABINOIDS UR QL SCN: NEGATIVE
CHLORIDE SERPL-SCNC: 100 MMOL/L (ref 98–107)
CLARITY UR: CLEAR
CO2 SERPL-SCNC: 28 MMOL/L (ref 23–31)
COCAINE UR QL SCN: NEGATIVE
COLOR UR AUTO: YELLOW
CREAT SERPL-MCNC: 0.89 MG/DL (ref 0.72–1.25)
CREAT/UREA NIT SERPL: 10
EOSINOPHIL # BLD AUTO: 0.06 X10(3)/MCL (ref 0–0.9)
EOSINOPHIL NFR BLD AUTO: 0.8 %
ERYTHROCYTE [DISTWIDTH] IN BLOOD BY AUTOMATED COUNT: 12.6 % (ref 11.5–17)
FENTANYL UR QL SCN: NEGATIVE
GFR SERPLBLD CREATININE-BSD FMLA CKD-EPI: >60 ML/MIN/1.73/M2
GLOBULIN SER-MCNC: 3.1 GM/DL (ref 2.4–3.5)
GLUCOSE SERPL-MCNC: 83 MG/DL (ref 82–115)
GLUCOSE UR QL STRIP: NORMAL
HCT VFR BLD AUTO: 44.3 % (ref 42–52)
HGB BLD-MCNC: 15.4 G/DL (ref 14–18)
HGB UR QL STRIP: NEGATIVE
HOLD SPECIMEN: NORMAL
HYALINE CASTS #/AREA URNS LPF: ABNORMAL /LPF
IMM GRANULOCYTES # BLD AUTO: 0.02 X10(3)/MCL (ref 0–0.04)
IMM GRANULOCYTES NFR BLD AUTO: 0.3 %
KETONES UR QL STRIP: ABNORMAL
LACTATE SERPL-SCNC: 1.3 MMOL/L (ref 0.5–2.2)
LEUKOCYTE ESTERASE UR QL STRIP: NEGATIVE
LYMPHOCYTES # BLD AUTO: 1.9 X10(3)/MCL (ref 0.6–4.6)
LYMPHOCYTES NFR BLD AUTO: 26.4 %
MAGNESIUM SERPL-MCNC: 1.8 MG/DL (ref 1.6–2.6)
MCH RBC QN AUTO: 31.1 PG (ref 27–31)
MCHC RBC AUTO-ENTMCNC: 34.8 G/DL (ref 33–36)
MCV RBC AUTO: 89.5 FL (ref 80–94)
MDMA UR QL SCN: NEGATIVE
MONOCYTES # BLD AUTO: 0.83 X10(3)/MCL (ref 0.1–1.3)
MONOCYTES NFR BLD AUTO: 11.5 %
MUCOUS THREADS URNS QL MICRO: ABNORMAL /LPF
NEUTROPHILS # BLD AUTO: 4.35 X10(3)/MCL (ref 2.1–9.2)
NEUTROPHILS NFR BLD AUTO: 60.6 %
NITRITE UR QL STRIP: NEGATIVE
NRBC BLD AUTO-RTO: 0 %
OPIATES UR QL SCN: NEGATIVE
PCP UR QL: NEGATIVE
PH UR STRIP: 7 [PH]
PH UR: 7 [PH] (ref 3–11)
PLATELET # BLD AUTO: 169 X10(3)/MCL (ref 130–400)
PMV BLD AUTO: 11.6 FL (ref 7.4–10.4)
POCT GLUCOSE: 93 MG/DL (ref 70–110)
POTASSIUM SERPL-SCNC: 3 MMOL/L (ref 3.5–5.1)
PROT SERPL-MCNC: 7.2 GM/DL (ref 5.8–7.6)
PROT UR QL STRIP: ABNORMAL
RBC # BLD AUTO: 4.95 X10(6)/MCL (ref 4.7–6.1)
RBC #/AREA URNS AUTO: ABNORMAL /HPF
SODIUM SERPL-SCNC: 142 MMOL/L (ref 136–145)
SP GR UR STRIP.AUTO: ABNORMAL
SQUAMOUS #/AREA URNS LPF: ABNORMAL /HPF
TSH SERPL-ACNC: 2.65 UIU/ML (ref 0.35–4.94)
UROBILINOGEN UR STRIP-ACNC: ABNORMAL
WBC # BLD AUTO: 7.19 X10(3)/MCL (ref 4.5–11.5)
WBC #/AREA URNS AUTO: ABNORMAL /HPF

## 2025-03-30 PROCEDURE — 96360 HYDRATION IV INFUSION INIT: CPT

## 2025-03-30 PROCEDURE — 99285 EMERGENCY DEPT VISIT HI MDM: CPT | Mod: 25

## 2025-03-30 PROCEDURE — 25500020 PHARM REV CODE 255

## 2025-03-30 PROCEDURE — 83735 ASSAY OF MAGNESIUM: CPT | Performed by: EMERGENCY MEDICINE

## 2025-03-30 PROCEDURE — 96361 HYDRATE IV INFUSION ADD-ON: CPT

## 2025-03-30 PROCEDURE — 81001 URINALYSIS AUTO W/SCOPE: CPT | Performed by: EMERGENCY MEDICINE

## 2025-03-30 PROCEDURE — 85025 COMPLETE CBC W/AUTO DIFF WBC: CPT | Performed by: EMERGENCY MEDICINE

## 2025-03-30 PROCEDURE — 93005 ELECTROCARDIOGRAM TRACING: CPT

## 2025-03-30 PROCEDURE — 25000003 PHARM REV CODE 250: Performed by: EMERGENCY MEDICINE

## 2025-03-30 PROCEDURE — 80307 DRUG TEST PRSMV CHEM ANLYZR: CPT | Performed by: EMERGENCY MEDICINE

## 2025-03-30 PROCEDURE — 96372 THER/PROPH/DIAG INJ SC/IM: CPT | Performed by: EMERGENCY MEDICINE

## 2025-03-30 PROCEDURE — 84443 ASSAY THYROID STIM HORMONE: CPT | Performed by: EMERGENCY MEDICINE

## 2025-03-30 PROCEDURE — 82962 GLUCOSE BLOOD TEST: CPT

## 2025-03-30 PROCEDURE — 80053 COMPREHEN METABOLIC PANEL: CPT | Performed by: EMERGENCY MEDICINE

## 2025-03-30 PROCEDURE — 83605 ASSAY OF LACTIC ACID: CPT | Performed by: EMERGENCY MEDICINE

## 2025-03-30 PROCEDURE — 63600175 PHARM REV CODE 636 W HCPCS: Performed by: EMERGENCY MEDICINE

## 2025-03-30 RX ORDER — BENZTROPINE MESYLATE 1 MG/ML
1 INJECTION, SOLUTION INTRAMUSCULAR; INTRAVENOUS
Status: COMPLETED | OUTPATIENT
Start: 2025-03-30 | End: 2025-03-30

## 2025-03-30 RX ORDER — POTASSIUM CHLORIDE 20 MEQ/1
20 TABLET, EXTENDED RELEASE ORAL
Status: COMPLETED | OUTPATIENT
Start: 2025-03-30 | End: 2025-03-30

## 2025-03-30 RX ORDER — POTASSIUM CHLORIDE 20 MEQ/1
40 TABLET, EXTENDED RELEASE ORAL
Status: COMPLETED | OUTPATIENT
Start: 2025-03-30 | End: 2025-03-30

## 2025-03-30 RX ADMIN — SODIUM CHLORIDE 1000 ML: 9 INJECTION, SOLUTION INTRAVENOUS at 07:03

## 2025-03-30 RX ADMIN — POTASSIUM CHLORIDE 40 MEQ: 1500 TABLET, EXTENDED RELEASE ORAL at 09:03

## 2025-03-30 RX ADMIN — POTASSIUM CHLORIDE 20 MEQ: 1500 TABLET, EXTENDED RELEASE ORAL at 11:03

## 2025-03-30 RX ADMIN — IOHEXOL 100 ML: 350 INJECTION, SOLUTION INTRAVENOUS at 08:03

## 2025-03-30 RX ADMIN — BENZTROPINE MESYLATE 1 MG: 1 INJECTION INTRAMUSCULAR; INTRAVENOUS at 07:03

## 2025-03-30 RX ADMIN — SODIUM CHLORIDE 1000 ML: 9 INJECTION, SOLUTION INTRAVENOUS at 09:03

## 2025-03-30 NOTE — ED PROVIDER NOTES
Encounter Date: 3/30/2025      History     Chief Complaint   Patient presents with    Altered Mental Status    Weakness    Eating Disorder     Sister states starting 3 weeks ago not eating and losing weight.  Becoming progressively weaker.  Has seen primary care for same.  Today extremely weak and confused.  Does not know birth year in Triage.  20 pound weight loss in 3 weeks.       HPI: Please see MDM    Past Medical History:  He  has a past medical history of Schizophrenia, unspecified.    Past Surgical History:  He  has no past surgical history on file.    Allergies:  Review of patient's allergies indicates:  No Known Allergies    Family History:  His family history is not on file.    Social History:  He  reports that he has been smoking cigarettes. He has never used smokeless tobacco. He reports that he does not currently use alcohol. He reports that he does not use drugs.    Home Medications:  He has a current medication list which includes the following prescription(s): albuterol, aspirin, atenolol, chlorthalidone, citalopram, clonazepam, clozapine, divalproex er, docusate sodium, ezetimibe, folic acid, gemfibrozil, haloperidol, haloperidol, nebulizer and compressor, omeprazole, pioglitazone, potassium chloride sa, prenatal vitamin, ramipril, tab-a-carl multivitamin w-iron, tradjenta, and trazodone.     ROS  Pertinent positives and negatives listed in HPI. All other systems are reviewed and are negative.    Physical Exam     Initial Vitals [03/30/25 0626]   BP Pulse Resp Temp SpO2   124/82 89 18 97.6 °F (36.4 °C) (!) 92 %      MAP       --         General: No acute distress  HEENT: Normocephalic, atraumatic. Face symmetric.   Cardiovascular: Regular rate & rhythm  Pulmonary: Bilateral symmetric chest rise, Non-labored.    Abdominal:  nondistended  Extremities: No clubbing or cyanosis.  Skin:  Exposed skin is warm & dry.  Neuro:   Patient moves all extremities equally. Sensation intact bilateraly.    ED Course    Procedures  Labs Reviewed   COMPREHENSIVE METABOLIC PANEL - Abnormal       Result Value    Sodium 142      Potassium 3.0 (*)     Chloride 100      CO2 28      Glucose 83      Blood Urea Nitrogen 8.5      Creatinine 0.89      Calcium 9.9      Protein Total 7.2      Albumin 4.1      Globulin 3.1      Albumin/Globulin Ratio 1.3      Bilirubin Total 0.9      ALP 88      ALT 16      AST 18      eGFR >60      Anion Gap 14.0      BUN/Creatinine Ratio 10     URINALYSIS, REFLEX TO URINE CULTURE - Abnormal    Color, UA Yellow      Appearance, UA Clear      Specific Gravity, UA        pH, UA 7.0      Protein, UA Trace (*)     Glucose, UA Normal      Ketones, UA 1+ (*)     Blood, UA Negative      Bilirubin, UA Negative      Urobilinogen, UA 1+ (*)     Nitrites, UA Negative      Leukocyte Esterase, UA Negative      RBC, UA 0-5      WBC, UA 0-5      Bacteria, UA None Seen      Squamous Epithelial Cells, UA None Seen      Mucous, UA Trace (*)     Hyaline Casts, UA None Seen     DRUG SCREEN, URINE (BEAKER) - Abnormal    Amphetamines, Urine Negative      Barbiturates, Urine Negative      Benzodiazepine, Urine Negative      Cannabinoids, Urine Negative      Cocaine, Urine Negative      Fentanyl, Urine Negative      MDMA, Urine Negative      Opiates, Urine Negative      Phencyclidine, Urine Negative      pH, Urine 7.0      Specific Gravity, Urine Auto >1.050 (*)     Narrative:     Cut off concentrations:    Amphetamines - 1000 ng/ml  Barbiturates - 200 ng/ml  Benzodiazepine - 200 ng/ml  Cannabinoids (THC) - 50 ng/ml  Cocaine - 300 ng/ml  Fentanyl - 1.0 ng/ml  MDMA - 500 ng/ml  Opiates - 300 ng/ml   Phencyclidine (PCP) - 25 ng/ml    Specimen submitted for drug analysis and tested for pH and specific gravity in order to evaluate sample integrity. Suspect tampering if specific gravity is <1.003 and/or pH is not within the range of 4.5 - 8.0  False negatives may result form substances such as bleach added to urine.  False positives may  result for the presence of a substance with similar chemical structure to the drug or its metabolite.    This test provides only a PRELIMINARY analytical test result. A more specific alternate chemical method must be used in order to obtain a confirmed analytical result. Gas chromatography/mass spectrometry (GC/MS) is the preferred confirmatory method. Other chemical confirmation methods are available. Clinical consideration and professional judgement should be applied to any drug of abuse test result, particularly when preliminary positive results are used.    Positive results will be confirmed only at the physicians request. Unconfirmed screening results are to be used only for medical purposes (treatment).        CBC WITH DIFFERENTIAL - Abnormal    WBC 7.19      RBC 4.95      Hgb 15.4      Hct 44.3      MCV 89.5      MCH 31.1 (*)     MCHC 34.8      RDW 12.6      Platelet 169      MPV 11.6 (*)     Neut % 60.6      Lymph % 26.4      Mono % 11.5      Eos % 0.8      Basophil % 0.4      Imm Grans % 0.3      Neut # 4.35      Lymph # 1.90      Mono # 0.83      Eos # 0.06      Baso # 0.03      Imm Gran # 0.02      NRBC% 0.0     LACTIC ACID, PLASMA - Normal    Lactic Acid Level 1.3     TSH - Normal    TSH 2.652     MAGNESIUM - Normal    Magnesium Level 1.80     CBC W/ AUTO DIFFERENTIAL    Narrative:     The following orders were created for panel order CBC auto differential.  Procedure                               Abnormality         Status                     ---------                               -----------         ------                     CBC with Differential[[id number]]       Abnormal            Final result                 Please view results for these tests on the individual orders.   EXTRA TUBES    Narrative:     The following orders were created for panel order EXTRA TUBES.  Procedure                               Abnormality         Status                     ---------                                -----------         ------                     Light Blue Top Hold[[id number]]                             Final result               Light Green Top Hold[[id number]]                            Final result               Lavender Top Hold[[id number]]                               Final result               Gold Top Hold[[id number]]                                   Final result                 Please view results for these tests on the individual orders.   LIGHT BLUE TOP HOLD    Extra Tube Hold for add-ons.     LIGHT GREEN TOP HOLD    Extra Tube Hold for add-ons.     LAVENDER TOP HOLD    Extra Tube Hold for add-ons.     GOLD TOP HOLD    Extra Tube Hold for add-ons.     POCT GLUCOSE    POCT Glucose 93          ECG Results              EKG 12-lead (Final result)        Collection Time Result Time QRS Duration OHS QTC Calculation    03/30/25 07:08:52 04/01/25 15:43:36 84 494                     Final result by Interface, Lab In [facility] (04/01/25 15:43:42)                   Narrative:    Test Reason : R53.1,    Vent. Rate :  72 BPM     Atrial Rate :  72 BPM     P-R Int : 146 ms          QRS Dur :  84 ms      QT Int : 452 ms       P-R-T Axes :  45  78  33 degrees    QTcB Int : 494 ms    Normal sinus rhythm  Prolonged QT  Abnormal ECG  When compared with ECG of 21-Nov-2022 12:53,  QT has lengthened  Confirmed by Morgan Sue ([id number]) on 4/1/2025 3:43:33 PM    Referred By: AAAREFERRAL SELF           Confirmed By: Morgan Sue                                     EKG 12-lead (Final result)  Result time 04/03/25 11:16:34      Final result by Unknown User (04/03/25 11:16:34)                                      Imaging Results              CT Abdomen Pelvis With IV Contrast NO Oral Contrast (Final result)  Result time 03/30/25 08:31:22      Final result by Patricia Sotelo MD (03/30/25 08:31:22)                   Impression:      1. No appreciable acute intra-abdominal abnormality by CT evaluation      Electronically signed  by: Patricia Robert  Date:    03/30/2025  Time:    08:31               Narrative:    EXAMINATION:  CT ABDOMEN PELVIS WITH IV CONTRAST    CLINICAL HISTORY:  Abdominal pain, acute, nonlocalized;    TECHNIQUE:  Helically acquired images with axial, sagittal and coronal reformations were obtained from the lung bases to the pubic symphysis after the IV administration of contrast.    Automated tube current modulation, weight-based exposure dosing, and/or iterative reconstruction technique utilized to reach lowest reasonably achievable exposure rate.    DLP: 674 mGy*cm    COMPARISON:  CT abdomen pelvis 01/10/2019    FINDINGS:  HEART: There are coronary artery calcifications.    LUNG BASES: Well aerated.    LIVER: Small pericapsular nodules at the posterior right lobe of the liver have been present without detrimental change dating back to 2019 compatible with indolent lesion requiring no imaging follow-up.    BILIARY: No calcified gallstones.    PANCREAS: No inflammatory change.    SPLEEN: Normal in size    ADRENALS: No mass.    KIDNEYS/URETERS: There are small renal cortical cysts and too small to characterize renal cortical hypodensities. No follow-up imaging is recommended as incidental lesions are likely benign.  One slightly hyperdense lesion at the anterolateral right kidney measures 1.7 cm; previously at this site there was a 2.5 cm simple cyst.  This likely represents a complicated cyst.  No hydronephrosis.    GI TRACT/MESENTERY: No evidence of bowel obstruction.  The appendix is normal. There are small incidental lipomas at the ascending colon.    PERITONEUM: No free fluid.No free air.    LYMPH NODES: No enlarged lymph nodes by size criteria.    VASCULATURE: Aortoiliac atherosclerosis.    BLADDER: Normal appearance given degree of distention.    REPRODUCTIVE ORGANS: Normal as visualized.    SOFT TISSUES: Unremarkable.    BONES: No acute osseous abnormality.                                       CT Head Without  Contrast (Final result)  Result time 03/30/25 08:36:39      Final result by Patricia Sotelo MD (03/30/25 08:36:39)                   Impression:      No appreciable acute intracranial abnormality.    Periventricular and subcortical white matter changes most compatible with chronic small vessel ischemic disease.      Electronically signed by: Patricia Sotelo  Date:    03/30/2025  Time:    08:36               Narrative:    EXAMINATION:  CT HEAD WITHOUT CONTRAST    CLINICAL HISTORY:  Mental status change, unknown cause;    TECHNIQUE:  Low dose axial CT images obtained throughout the head without intravenous contrast.  Axial, sagittal and coronal reconstructions were performed and interpreted.    DLP: 988 mGycm    All CT scans at this location are performed using dose optimization techniques as appropriate to a performed exam including the following automated exposure control, adjustment of the mA and/or kV according to patient size and/or use of iterative reconstruction technique    COMPARISON:  MRI brain 11/22/2022, CT 11/21/2022    FINDINGS:  BRAIN: Gray white differentiation is maintained. Periventricular and subcortical white matter changes most compatible with chronic small vessel ischemic disease.  Old right basal ganglia and left cerebellar lacunar infarcts.  No hemorrhage. No edema. No mass effect or midline shift.  The posterior fossa and midline structures are unremarkable.    VENTRICLES: Normal in size and configuration.    EXTRA-AXIAL: No abnormal extra-axial collections.    BONES: Multifocal small indeterminate lytic calvarial lesions, similar to prior CT exam.    SINUSES AND MASTOIDS: Visualized paranasal sinuses and mastoid air cells are clear.                                       X-Ray Chest AP Portable (Final result)  Result time 03/30/25 07:51:09      Final result by Pete Beth MD (03/30/25 07:51:09)                   Impression:      No acute cardiopulmonary process  identified.      Electronically signed by: Pete Beth  Date:    03/30/2025  Time:    07:51               Narrative:    EXAMINATION:  XR CHEST AP PORTABLE    CLINICAL HISTORY:  Chest Pain;    TECHNIQUE:  One view    COMPARISON:  July 15, 2024.    FINDINGS:  Cardiopericardial silhouette is within normal limits.  Chronic interstitial changes of the lungs without dense focal or segmental consolidation, congestive process, pleural effusions or pneumothorax.                                       Medications   sodium chloride 0.9% bolus 1,000 mL 1,000 mL (0 mLs Intravenous Stopped 3/30/25 0923)   benztropine mesylate injection 1 mg (1 mg Intramuscular Given 3/30/25 0701)   iohexoL (OMNIPAQUE 350) 350 mg iodine/mL injection (100 mLs  Given 3/30/25 0800)   potassium chloride SA CR tablet 40 mEq (40 mEq Oral Given 3/30/25 0918)   sodium chloride 0.9% bolus 1,000 mL 1,000 mL (0 mLs Intravenous Stopped 3/30/25 1204)   potassium chloride SA CR tablet 20 mEq (20 mEq Oral Given 3/30/25 1137)     Medical Decision Making  Amount and/or Complexity of Data Reviewed  Labs: ordered.  Radiology: ordered.    Risk  Prescription drug management.          Amount and/or Complexity of Data Reviewed  External Data Reviewed:  Notes, labs  Labs:  All labs that have been ordered have been reviewed and are documented in ED Course.  Radiology: All images that have been ordered have been reviewed and are documented in ED Course.         Ddx: Appendicitis, Diverticulitis, Pancreatitis, Pyelonephritis, AAA, Dissection, MI, Gastric Ulcer, Peptic Ulcer, Urinary retention, among others      The patient is now resting comfortably and feels better after therapies in the ER.  The patient is alert and in no distress. The repeat examination is unremarkable and benign. There is no discomfort at McBurney's point to suggest acute appendicitis.  There is no pulsatile mass or abdominal bruit suggestive of AAA.     The history, exam, diagnostic testing, and  current condition also do not suggest a bowel obstruction, acute cholecystitis, bowel perforation, major gastrointestinal bleeding, severe diverticulitis, mesenteric ischemia, volvulus, sepsis, or another significant pathology warrant further testing, continued ED treatment, admission, or surgical evaluation at this point. The vital signs have been stable. The patient does not have uncontrollable pain, intractable vomiting, or other significant symptoms. The patient's condition is stable and appropriate for discharge from the emergency department.     The patient will pursue a further outpatient evaluation with the primary care physician or other designated or consulting physician, as indicated in the discharge instructions.                            Clinical Impression:  Final diagnoses:  [R53.1] Weakness  [E86.0] Dehydration (Primary)  [F20.9] Schizophrenia, unspecified type  [T50.905A] Adverse effect of drug, initial encounter  [R63.0] Decreased appetite  [E87.6] Hypokalemia            ED Disposition Condition    Discharge Stable          ED Prescriptions    None       Follow-up Information       Follow up With Specialties Details Why Contact Info    Follow up with your primary care doctor or psychiatrist   For re-evaluation              Oscar Newman MD  04/06/25 [id number]

## 2025-04-01 LAB
OHS QRS DURATION: 84 MS
OHS QTC CALCULATION: 494 MS

## 2025-05-09 ENCOUNTER — HOSPITAL ENCOUNTER (EMERGENCY)
Facility: HOSPITAL | Age: 65
Discharge: PSYCHIATRIC OR SUBSTANCE USE FACILITY | End: 2025-05-09
Attending: EMERGENCY MEDICINE
Payer: MEDICARE

## 2025-05-09 VITALS
TEMPERATURE: 98 F | HEIGHT: 71 IN | SYSTOLIC BLOOD PRESSURE: 151 MMHG | OXYGEN SATURATION: 96 % | RESPIRATION RATE: 16 BRPM | WEIGHT: 180 LBS | HEART RATE: 95 BPM | DIASTOLIC BLOOD PRESSURE: 79 MMHG | BODY MASS INDEX: 25.2 KG/M2

## 2025-05-09 DIAGNOSIS — F22 PARANOID BEHAVIOR: Primary | ICD-10-CM

## 2025-05-09 LAB
ACCEPTIBLE SP GR UR QL: 1.02 (ref 1–1.03)
ALBUMIN SERPL-MCNC: 3.3 G/DL (ref 3.4–4.8)
ALBUMIN/GLOB SERPL: 1.2 RATIO (ref 1.1–2)
ALP SERPL-CCNC: 101 UNIT/L (ref 40–150)
ALT SERPL-CCNC: 11 UNIT/L (ref 0–55)
AMPHET UR QL SCN: NEGATIVE
ANION GAP SERPL CALC-SCNC: 7 MEQ/L
AST SERPL-CCNC: 11 UNIT/L (ref 11–45)
BACTERIA #/AREA URNS AUTO: ABNORMAL /HPF
BARBITURATE SCN PRESENT UR: NEGATIVE
BASOPHILS # BLD AUTO: 0.03 X10(3)/MCL
BASOPHILS NFR BLD AUTO: 0.5 %
BENZODIAZ UR QL SCN: NEGATIVE
BILIRUB SERPL-MCNC: 0.4 MG/DL
BILIRUB UR QL STRIP.AUTO: NEGATIVE
BUN SERPL-MCNC: 12.1 MG/DL (ref 8.4–25.7)
CALCIUM SERPL-MCNC: 8.8 MG/DL (ref 8.8–10)
CANNABINOIDS UR QL SCN: NEGATIVE
CHLORIDE SERPL-SCNC: 105 MMOL/L (ref 98–107)
CLARITY UR: CLEAR
CO2 SERPL-SCNC: 27 MMOL/L (ref 23–31)
COCAINE UR QL SCN: NEGATIVE
COLOR UR AUTO: YELLOW
CREAT SERPL-MCNC: 0.77 MG/DL (ref 0.72–1.25)
CREAT/UREA NIT SERPL: 16
EOSINOPHIL # BLD AUTO: 0.06 X10(3)/MCL (ref 0–0.9)
EOSINOPHIL NFR BLD AUTO: 1.1 %
ERYTHROCYTE [DISTWIDTH] IN BLOOD BY AUTOMATED COUNT: 13.4 % (ref 11.5–17)
ETHANOL SERPL-MCNC: <10 MG/DL
FENTANYL UR QL SCN: POSITIVE
GFR SERPLBLD CREATININE-BSD FMLA CKD-EPI: >60 ML/MIN/1.73/M2
GLOBULIN SER-MCNC: 2.7 GM/DL (ref 2.4–3.5)
GLUCOSE SERPL-MCNC: 88 MG/DL (ref 82–115)
GLUCOSE UR QL STRIP: NORMAL
HCT VFR BLD AUTO: 38.3 % (ref 42–52)
HGB BLD-MCNC: 12.6 G/DL (ref 14–18)
HGB UR QL STRIP: NEGATIVE
IMM GRANULOCYTES # BLD AUTO: 0.01 X10(3)/MCL (ref 0–0.04)
IMM GRANULOCYTES NFR BLD AUTO: 0.2 %
KETONES UR QL STRIP: NEGATIVE
LEUKOCYTE ESTERASE UR QL STRIP: NEGATIVE
LYMPHOCYTES # BLD AUTO: 1.21 X10(3)/MCL (ref 0.6–4.6)
LYMPHOCYTES NFR BLD AUTO: 22 %
MCH RBC QN AUTO: 30.1 PG (ref 27–31)
MCHC RBC AUTO-ENTMCNC: 32.9 G/DL (ref 33–36)
MCV RBC AUTO: 91.4 FL (ref 80–94)
MDMA UR QL SCN: NEGATIVE
MONOCYTES # BLD AUTO: 0.59 X10(3)/MCL (ref 0.1–1.3)
MONOCYTES NFR BLD AUTO: 10.7 %
MUCOUS THREADS URNS QL MICRO: ABNORMAL /LPF
NEUTROPHILS # BLD AUTO: 3.59 X10(3)/MCL (ref 2.1–9.2)
NEUTROPHILS NFR BLD AUTO: 65.5 %
NITRITE UR QL STRIP: NEGATIVE
NRBC BLD AUTO-RTO: 0 %
OPIATES UR QL SCN: NEGATIVE
PCP UR QL: NEGATIVE
PH UR STRIP: 6 [PH]
PH UR: 6 [PH] (ref 3–11)
PLATELET # BLD AUTO: 176 X10(3)/MCL (ref 130–400)
PMV BLD AUTO: 10.9 FL (ref 7.4–10.4)
POTASSIUM SERPL-SCNC: 3.5 MMOL/L (ref 3.5–5.1)
PROT SERPL-MCNC: 6 GM/DL (ref 5.8–7.6)
PROT UR QL STRIP: NEGATIVE
RBC # BLD AUTO: 4.19 X10(6)/MCL (ref 4.7–6.1)
RBC #/AREA URNS AUTO: ABNORMAL /HPF
SODIUM SERPL-SCNC: 139 MMOL/L (ref 136–145)
SP GR UR STRIP.AUTO: 1.02 (ref 1–1.03)
SQUAMOUS #/AREA URNS LPF: ABNORMAL /HPF
UROBILINOGEN UR STRIP-ACNC: 2
WBC # BLD AUTO: 5.49 X10(3)/MCL (ref 4.5–11.5)
WBC #/AREA URNS AUTO: ABNORMAL /HPF

## 2025-05-09 PROCEDURE — 80307 DRUG TEST PRSMV CHEM ANLYZR: CPT | Performed by: EMERGENCY MEDICINE

## 2025-05-09 PROCEDURE — 99285 EMERGENCY DEPT VISIT HI MDM: CPT

## 2025-05-09 PROCEDURE — 82077 ASSAY SPEC XCP UR&BREATH IA: CPT | Performed by: EMERGENCY MEDICINE

## 2025-05-09 PROCEDURE — 80053 COMPREHEN METABOLIC PANEL: CPT | Performed by: EMERGENCY MEDICINE

## 2025-05-09 PROCEDURE — 85025 COMPLETE CBC W/AUTO DIFF WBC: CPT | Performed by: EMERGENCY MEDICINE

## 2025-05-09 PROCEDURE — 81001 URINALYSIS AUTO W/SCOPE: CPT | Performed by: EMERGENCY MEDICINE

## 2025-05-09 NOTE — ED NOTES
Pt pec'd at 1630 by Dr. Miller, security notified; pt changed into paper scrubs and initial safety check performed with RADHIKA Willingham; all belongings removed from bedside

## 2025-05-09 NOTE — ED PROVIDER NOTES
Encounter Date: 5/9/2025    SCRIBE #1 NOTE: I, Ivett Briones, am scribing for, and in the presence of,  Rishabh Miller MD. I have scribed the following portions of the note - Other sections scribed: HPI, ROS, PE.       History     Chief Complaint   Patient presents with    OPC     Pt arrives via PD under an OPC. Pt got in an argument with sister & said he was going to punch her in the face. Pt denies HI towards his sister & anyone else in triage. Also denies SI auditory/visual hallucinations. Calm & cooperative in triage.      Patient is a 64 year old male with a history of schizophrenia presenting to the ED after an argument with his sister. Patient denies threatening to punch his sister, but states he was mad she wouldn't cook dinner. Patient denies wanting to hurt himself. Patient states he is taking his medication.     The history is provided by the patient, medical records and the police.     Review of patient's allergies indicates:  No Known Allergies  Past Medical History:   Diagnosis Date    Schizophrenia, unspecified      No past surgical history on file.  No family history on file.  Social History[1]  Review of Systems   Psychiatric/Behavioral:  Negative for self-injury.        Physical Exam     Initial Vitals [05/09/25 1539]   BP Pulse Resp Temp SpO2   (!) 144/101 102 18 97.6 °F (36.4 °C) 100 %      MAP       --         Physical Exam    Nursing note and vitals reviewed.  Constitutional: No distress.   Slow speech    HENT:   Head: Normocephalic and atraumatic.   periodontal disease   Neck: Trachea normal.   Cardiovascular:  Normal rate and regular rhythm.           No murmur heard.  Pulmonary/Chest: Breath sounds normal. No respiratory distress.   Abdominal: Abdomen is soft. Bowel sounds are normal. He exhibits no distension. There is no abdominal tenderness.   Musculoskeletal:         General: Normal range of motion.      Lumbar back: Normal.     Neurological: He is alert and oriented to person, place,  and time. He has normal strength. No cranial nerve deficit.   Skin: Skin is warm and dry. No rash noted.   Psychiatric: Judgment normal. He is combative.         ED Course   Procedures  Labs Reviewed   COMPREHENSIVE METABOLIC PANEL - Abnormal       Result Value    Sodium 139      Potassium 3.5      Chloride 105      CO2 27      Glucose 88      Blood Urea Nitrogen 12.1      Creatinine 0.77      Calcium 8.8      Protein Total 6.0      Albumin 3.3 (*)     Globulin 2.7      Albumin/Globulin Ratio 1.2      Bilirubin Total 0.4            ALT 11      AST 11      eGFR >60      Anion Gap 7.0      BUN/Creatinine Ratio 16     DRUG SCREEN, URINE (BEAKER) - Abnormal    Amphetamines, Urine Negative      Barbiturates, Urine Negative      Benzodiazepine, Urine Negative      Cannabinoids, Urine Negative      Cocaine, Urine Negative      Fentanyl, Urine Positive (*)     MDMA, Urine Negative      Opiates, Urine Negative      Phencyclidine, Urine Negative      pH, Urine 6.0      Specific Gravity, Urine Auto 1.019      Narrative:     Cut off concentrations:    Amphetamines - 1000 ng/ml  Barbiturates - 200 ng/ml  Benzodiazepine - 200 ng/ml  Cannabinoids (THC) - 50 ng/ml  Cocaine - 300 ng/ml  Fentanyl - 1.0 ng/ml  MDMA - 500 ng/ml  Opiates - 300 ng/ml   Phencyclidine (PCP) - 25 ng/ml    Specimen submitted for drug analysis and tested for pH and specific gravity in order to evaluate sample integrity. Suspect tampering if specific gravity is <1.003 and/or pH is not within the range of 4.5 - 8.0  False negatives may result form substances such as bleach added to urine.  False positives may result for the presence of a substance with similar chemical structure to the drug or its metabolite.    This test provides only a PRELIMINARY analytical test result. A more specific alternate chemical method must be used in order to obtain a confirmed analytical result. Gas chromatography/mass spectrometry (GC/MS) is the preferred confirmatory  method. Other chemical confirmation methods are available. Clinical consideration and professional judgement should be applied to any drug of abuse test result, particularly when preliminary positive results are used.    Positive results will be confirmed only at the physicians request. Unconfirmed screening results are to be used only for medical purposes (treatment).        CBC WITH DIFFERENTIAL - Abnormal    WBC 5.49      RBC 4.19 (*)     Hgb 12.6 (*)     Hct 38.3 (*)     MCV 91.4      MCH 30.1      MCHC 32.9 (*)     RDW 13.4      Platelet 176      MPV 10.9 (*)     Neut % 65.5      Lymph % 22.0      Mono % 10.7      Eos % 1.1      Basophil % 0.5      Imm Grans % 0.2      Neut # 3.59      Lymph # 1.21      Mono # 0.59      Eos # 0.06      Baso # 0.03      Imm Gran # 0.01      NRBC% 0.0     URINALYSIS, REFLEX TO URINE CULTURE - Abnormal    Color, UA Yellow      Appearance, UA Clear      Specific Gravity, UA 1.019      pH, UA 6.0      Protein, UA Negative      Glucose, UA Normal      Ketones, UA Negative      Blood, UA Negative      Bilirubin, UA Negative      Urobilinogen, UA 2.0 (*)     Nitrites, UA Negative      Leukocyte Esterase, UA Negative      RBC, UA 0-5      WBC, UA 0-5      Bacteria, UA None Seen      Squamous Epithelial Cells, UA Trace      Mucous, UA Occasional (*)    ALCOHOL,MEDICAL (ETHANOL) - Normal    Ethanol Level <10.0     CBC W/ AUTO DIFFERENTIAL    Narrative:     The following orders were created for panel order CBC auto differential.  Procedure                               Abnormality         Status                     ---------                               -----------         ------                     CBC with Differential[[id number]]       Abnormal            Final result                 Please view results for these tests on the individual orders.          Imaging Results    None          Medications - No data to display  Medical Decision Making  The differential diagnosis includes, but  is not limited to, noncompliant, aggressive behavior, schizophrenia, psychosis    Physician's Emergency certificate filled out medically clear for psychiatric admission    Did have phone conversation with sister who reports significant increase in bizarre behavior and repeated threats to herself and others      Problems Addressed:  Paranoid behavior: complicated acute illness or injury that poses a threat to life or bodily functions    Amount and/or Complexity of Data Reviewed  Labs: ordered.            Scribe Attestation:   Scribe #1: I performed the above scribed service and the documentation accurately describes the services I performed. I attest to the accuracy of the note.    Attending Attestation:           Physician Attestation for Scribe:  Physician Attestation Statement for Scribe #1: I, Rishabh Miller MD, reviewed documentation, as scribed by Ivett Briones in my presence, and it is both accurate and complete.             ED Course as of 05/09/25 1837   Fri May 09, 2025   1747 Physician's Emergency certificate filled out [FK]      ED Course User Index  [FK] Rishabh Miller III, MD       Medically cleared for psychiatry placement: 5/9/2025  6:13 PM                   Clinical Impression:  Final diagnoses:  [F22] Paranoid behavior (Primary)          ED Disposition Condition    Transfer to Psych Facility Stable          ED Prescriptions    None       Follow-up Information    None            [1]   Social History  Tobacco Use    Smoking status: Every Day     Types: Cigarettes    Smokeless tobacco: Never   Substance Use Topics    Alcohol use: Not Currently    Drug use: Never        Rishabh Miller III, MD  05/09/25 7497

## 2025-05-10 NOTE — ED NOTES
Pt accepted to [facility] accepting practicioner; #[phone]; report given to LUIS Correa; arranging transport for pt

## 2025-06-12 ENCOUNTER — HOSPITAL ENCOUNTER (EMERGENCY)
Facility: HOSPITAL | Age: 65
Discharge: HOME | End: 2025-06-12
Attending: EMERGENCY MEDICINE
Payer: MEDICARE

## 2025-06-12 VITALS
WEIGHT: 176 LBS | RESPIRATION RATE: 17 BRPM | HEART RATE: 89 BPM | DIASTOLIC BLOOD PRESSURE: 69 MMHG | SYSTOLIC BLOOD PRESSURE: 95 MMHG | OXYGEN SATURATION: 99 % | TEMPERATURE: 98 F | BODY MASS INDEX: 23.84 KG/M2 | HEIGHT: 72 IN

## 2025-06-12 DIAGNOSIS — Z63.8 FAMILY DISCORD: ICD-10-CM

## 2025-06-12 DIAGNOSIS — F20.9 CHRONIC SCHIZOPHRENIA: Primary | ICD-10-CM

## 2025-06-12 PROCEDURE — 25000003 PHARM REV CODE 250: Performed by: EMERGENCY MEDICINE

## 2025-06-12 PROCEDURE — 99283 EMERGENCY DEPT VISIT LOW MDM: CPT

## 2025-06-12 RX ORDER — QUETIAPINE FUMARATE 100 MG/1
100 TABLET, FILM COATED ORAL 2 TIMES DAILY
Qty: 60 TABLET | Refills: 11 | Status: SHIPPED | OUTPATIENT
Start: 2025-06-12 | End: 2026-06-12

## 2025-06-12 RX ORDER — VENLAFAXINE 75 MG/1
75 TABLET ORAL DAILY
COMMUNITY

## 2025-06-12 RX ORDER — QUETIAPINE FUMARATE 100 MG/1
100 TABLET, FILM COATED ORAL
Status: COMPLETED | OUTPATIENT
Start: 2025-06-12 | End: 2025-06-12

## 2025-06-12 RX ORDER — MIRTAZAPINE 15 MG/1
15 TABLET, FILM COATED ORAL NIGHTLY
COMMUNITY

## 2025-06-12 RX ADMIN — QUETIAPINE FUMARATE 100 MG: 100 TABLET ORAL at 07:06

## 2025-06-12 SDOH — SOCIAL DETERMINANTS OF HEALTH (SDOH): OTHER SPECIFIED PROBLEMS RELATED TO PRIMARY SUPPORT GROUP: Z63.8

## 2025-06-12 NOTE — ED PROVIDER NOTES
Encounter Date: 6/12/2025    SCRIBE #1 NOTE: I, Dhaval Arizmendi, am scribing for, and in the presence of,  Al Jara MD. I have scribed the following portions of the note - Other sections scribed: HPI, ROS, PE.       History     Chief Complaint   Patient presents with    Psychiatric Evaluation     Got into verbal altercation with sister earlier today. Sister wanted patient to come to ED for psych eval and patient agreed. -SI/-HI/-hallucinations. Hx of schizophrenia. Reports compliance with psych meds.      The patient is a 63 y/o male with a hx of schizophrenia, generalized anxiety disorder, and major depressive disorder who presents to the ED for a psychiatric evaluation. The patient reports earlier today he got into a verbal argument with his sister due to her marijuana usage in the house. He states his sister requested he come to the ED for a psychiatric evaluation and he agreed. The patient endorses compliance with all psychiatric medications. The patient denies any suicidal ideas, homicidal ideas, or auditory/visual hallucinations.     The history is provided by the patient and medical records. No  was used.     Review of patient's allergies indicates:  No Known Allergies  Past Medical History:   Diagnosis Date    Schizophrenia, unspecified      No past surgical history on file.  No family history on file.  Social History[1]  Review of Systems   Constitutional:  Negative for chills, fatigue and fever.   HENT:  Negative for congestion and sore throat.    Eyes:  Negative for visual disturbance.   Respiratory:  Negative for cough and shortness of breath.    Cardiovascular:  Negative for chest pain.   Gastrointestinal:  Negative for abdominal pain, diarrhea, nausea and vomiting.   Genitourinary:  Negative for dysuria.   Musculoskeletal:  Negative for myalgias.   Skin:  Negative for rash.   Neurological:  Negative for weakness, numbness and headaches.   Psychiatric/Behavioral:   Negative for hallucinations and suicidal ideas.         Negative for Homicidal ideas       Physical Exam     Initial Vitals [06/12/25 1820]   BP Pulse Resp Temp SpO2   (!) 172/99 93 18 98.7 °F (37.1 °C) 99 %      MAP       --         Physical Exam    Nursing note and vitals reviewed.  Constitutional: He appears well-developed and well-nourished.   HENT:   Head: Normocephalic and atraumatic.   Right Ear: External ear normal.   Left Ear: External ear normal.   Nose: Nose normal.   Poor dentition    Eyes: Conjunctivae and EOM are normal. Pupils are equal, round, and reactive to light.   Neck: Neck supple.   Normal range of motion.  Cardiovascular:  Normal rate, regular rhythm, normal heart sounds and intact distal pulses.           Pulmonary/Chest: Breath sounds normal.   Abdominal: Abdomen is soft. Bowel sounds are normal.   Musculoskeletal:         General: Normal range of motion.      Cervical back: Normal range of motion and neck supple.     Neurological: He is alert and oriented to person, place, and time. He has normal strength. GCS score is 15. GCS eye subscore is 4. GCS verbal subscore is 5. GCS motor subscore is 6.   Skin: Skin is warm and dry. Capillary refill takes less than 2 seconds.   Psychiatric: He has a normal mood and affect. His behavior is normal. Judgment and thought content normal. He is not actively hallucinating. He expresses no homicidal and no suicidal ideation. He expresses no suicidal plans and no homicidal plans.   Calm and cooperative         ED Course   Procedures  Labs Reviewed - No data to display       Imaging Results    None          Medications   QUEtiapine tablet 100 mg (100 mg Oral Given 6/12/25 1938)     Medical Decision Making  Differential diagnosis includes but is not limited to: bipolar disorder, personality disorder, schizophrenia     Risk  Prescription drug management.            Scribe Attestation:   Scribe #1: I performed the above scribed service and the documentation  accurately describes the services I performed. I attest to the accuracy of the note.    Attending Attestation:           Physician Attestation for Scribe:  Physician Attestation Statement for Scribe #1: I, Al Jara MD, reviewed documentation, as scribed by Dhaval Arizmendi in my presence, and it is both accurate and complete.             ED Course as of 06/27/25 1854   Thu Jun 12, 2025 1907 Patient calm and cooperative.  Does not meet PEC criteria.  Is adamant that he would never harm his sister.  States she smokes THC and sometimes antagonizes him.  Lives with his brother and states he can come pick him up.  Encouraged medical compliance.  Stable for D/C [CL]      ED Course User Index  [CL] Al Jara MD                           Clinical Impression:  Final diagnoses:  [F20.9] Chronic schizophrenia (Primary)  [Z63.8] Family discord          ED Disposition Condition    Discharge Stable          ED Prescriptions       Medication Sig Dispense Start Date End Date Auth. Provider    QUEtiapine (SEROQUEL) 100 MG Tab Take 1 tablet (100 mg total) by mouth 2 (two) times daily. 60 tablet 6/12/2025 6/12/2026 Al Jara MD          Follow-up Information       Follow up With Specialties Details Why Contact Info    Follow up with your primary MD in 3-5 days if not improved.  Return to ED for worsening symptoms.                       [1]   Social History  Tobacco Use    Smoking status: Every Day     Types: Cigarettes    Smokeless tobacco: Never   Substance Use Topics    Alcohol use: Not Currently    Drug use: Never        Al Jara MD  06/27/25 1854

## 2025-06-12 NOTE — ED NOTES
Dr. Jara at bedside, states he will discharge pt. Pt denies SI/HI. Denies auditory/visual hallucinations. Compliant with meds. Pt belongings returned to pt. Pt contacting [facility]